# Patient Record
Sex: MALE | Race: WHITE | Employment: FULL TIME | URBAN - METROPOLITAN AREA
[De-identification: names, ages, dates, MRNs, and addresses within clinical notes are randomized per-mention and may not be internally consistent; named-entity substitution may affect disease eponyms.]

---

## 2017-08-18 ENCOUNTER — OFFICE VISIT (OUTPATIENT)
Dept: PEDIATRICS | Facility: CLINIC | Age: 27
End: 2017-08-18
Payer: COMMERCIAL

## 2017-08-18 VITALS — WEIGHT: 189 LBS | TEMPERATURE: 98.1 F | OXYGEN SATURATION: 99 % | HEART RATE: 85 BPM

## 2017-08-18 DIAGNOSIS — B35.3 ATHLETE'S FOOT ON RIGHT: Primary | ICD-10-CM

## 2017-08-18 PROCEDURE — 99202 OFFICE O/P NEW SF 15 MIN: CPT | Performed by: INTERNAL MEDICINE

## 2017-08-18 RX ORDER — CLOTRIMAZOLE AND BETAMETHASONE DIPROPIONATE 10; .64 MG/G; MG/G
CREAM TOPICAL 2 TIMES DAILY
Qty: 15 G | Refills: 1 | Status: SHIPPED | OUTPATIENT
Start: 2017-08-18 | End: 2019-08-16

## 2017-08-18 NOTE — PROGRESS NOTES
SUBJECTIVE:   Clayton Hatfield is a 27 year old male who presents to clinic today for the following health issues:      Rash      Duration: 1 months    Description  Location: right foor between toes  Itching: moderate    Intensity:  moderate    Accompanying signs and symptoms: redness and swelling    History (similar episodes/previous evaluation): None    Precipitating or alleviating factors:  New exposures:  None  Recent travel: no      Therapies tried and outcome: OTC Fungal creams no help      Had rash between the big toe and the second toe on both feet initially. Started using clotrimazole, with resolution of the rash in the left foot but not in the right foot. He switched to to  Terbinafine on the left foot, without further improvement. He tried to put gauze to separate the 2 toes to allow it to dry, but that caused more friction and irritation.    He is otherwise healthy no history of diabetes or other chronic illnesses. No recent antibiotic use.      No current outpatient prescriptions on file prior to visit.  No current facility-administered medications on file prior to visit.       Problem list, Medication list, Allergies, and Medical/Social/Surgical histories reviewed in [facility] and updated as appropriate.    OBJECTIVE:                                                    Pulse 85  Temp 98.1  F (36.7  C) (Temporal)  Wt 189 lb (85.7 kg)  SpO2 99%    GEN: healthy, alert and no distress  Right foot: Between the first and second toe there is a patch of erythema, some wetness, mildly scaly       Diagnostic test results:  No results found for this or any previous visit (from the past 24 hour(s)).       ASSESSMENT/PLAN:                                                      27 year old male with the following diagnoses and treatment plan:      ICD-10-CM    1. Athlete's foot on right B35.3 clotrimazole-betamethasone (LOTRISONE) cream     Might be secondary irritation type of inflammatory reaction in addition to  athlete's foot. I will like him to try a combination for clotrimazole and betamethasone. If there is no improvement in 1 week, we recommended he sees a dermatologist. I gave him the number for associated skin scare specialist if this does not improve with the above treatment.      Will call or return to clinic if worsening or symptoms not improving as discussed.  See Patient Instructions.        Juvencio Fuller MD-PhD  [facility]    (Note: Chart documentation was done in part with Dragon Voice Recognition software. Although reviewed after completion, some word and grammatical errors may remain.)

## 2017-08-18 NOTE — PATIENT INSTRUCTIONS
Make appointment(s) for:   -- dermatology if no improvement in one week.     [facility] [phone]         Medication(s) prescribed today:    Orders Placed This Encounter   Medications     clotrimazole-betamethasone (LOTRISONE) cream     Sig: Apply topically 2 times daily     Dispense:  15 g     Refill:  1

## 2017-08-18 NOTE — NURSING NOTE
Chief Complaint   Patient presents with     Derm Problem     Rash between toes on right foot, for 2 months, has tried 2 OTC's no help.        Initial Pulse 85  Temp 98.1  F (36.7  C) (Temporal)  Wt 189 lb (85.7 kg)  SpO2 99% There is no height or weight on file to calculate BMI.  Medication Reconciliation: complete

## 2017-08-18 NOTE — MR AVS SNAPSHOT
After Visit Summary   8/18/2017    Clayton Hatfield    MRN: [id number]           Patient Information     Date Of Birth          1990        Visit Information        Provider Department      8/18/2017 3:00 PM Juvencio Fuller MD [facility]        Today's Diagnoses     Athlete's foot on right    -  1      Care Instructions    Make appointment(s) for:   -- dermatology if no improvement in one week.     [facility] [phone]         Medication(s) prescribed today:    Orders Placed This Encounter   Medications     clotrimazole-betamethasone (LOTRISONE) cream     Sig: Apply topically 2 times daily     Dispense:  15 g     Refill:  1                   Follow-ups after your visit        Who to contact     If you have questions or need follow up information about today's clinic visit or your schedule please contact [facility] directly at [phone].  Normal or non-critical lab and imaging results will be communicated to you by [employer], letter or phone within 4 business days after the clinic has received the results. If you do not hear from us within 7 days, please contact the clinic through [employer] or phone. If you have a critical or abnormal lab result, we will notify you by phone as soon as possible.  Submit refill requests through [employer] or call your pharmacy and they will forward the refill request to us. Please allow 3 business days for your refill to be completed.          Additional Information About Your Visit        MyChart Information     [employer] gives you secure access to your electronic health record. If you see a primary care provider, you can also send messages to your care team and make appointments. If you have questions, please call your primary care clinic.  If you do not have a primary care provider, please call [phone] and they will assist you.      [employer] is an electronic gateway that provides easy, online access to your medical  records. With [employer], you can request a clinic appointment, read your test results, renew a prescription or communicate with your care team.     To access your existing account, please contact your [facility] or call [phone] for assistance.        Care EveryWhere ID     This is your Care EveryWhere ID. This could be used by other organizations to access your [facility] medical records  [id number]        Your Vitals Were     Pulse Temperature Pulse Oximetry             85 98.1  F (36.7  C) (Temporal) 99%          Blood Pressure from Last 3 Encounters:   No data found for BP    Weight from Last 3 Encounters:   08/18/17 189 lb (85.7 kg)              Today, you had the following     No orders found for display         Today's Medication Changes          These changes are accurate as of: 8/18/17  3:43 PM.  If you have any questions, ask your nurse or doctor.               Start taking these medicines.        Dose/Directions    clotrimazole-betamethasone cream   Commonly known as:  LOTRISONE   Used for:  Athlete's foot on right   Started by:  Juvencio Fuller MD        Apply topically 2 times daily   Quantity:  15 g   Refills:  1            Where to get your medicines      These medications were sent to [facility] - [place], [place] - [address], [address]  [address], [address], [facility] [address]     Phone:  [phone]     clotrimazole-betamethasone cream                Primary Care Provider    [facility]       No address on file        Equal Access to Services     BILL MONTALVO AH: Hadii aad ku hadasho Socharlesali, waaxda luqadaha, qaybta kaalmada adeegyada, shayla bridges. So [facility] [phone].    ATENCIÓN: Si habla español, tiene a anaya disposición servicios gratuitos de asistencia lingüística. Llame al [phone].    We comply with applicable federal civil rights laws and [place] laws. We do not  discriminate on the basis of race, color, national origin, age, disability sex, sexual orientation or gender identity.            Thank you!     Thank you for choosing [facility]  for your care. Our goal is always to provide you with excellent care. Hearing back from our patients is one way we can continue to improve our services. Please take a few minutes to complete the written survey that you may receive in the mail after your visit with us. Thank you!             Your Updated Medication List - Protect others around you: Learn how to safely use, store and throw away your medicines at www.disposemymeds.org.          This list is accurate as of: 8/18/17  3:43 PM.  Always use your most recent med list.                   Brand Name Dispense Instructions for use Diagnosis    clotrimazole-betamethasone cream    LOTRISONE    15 g    Apply topically 2 times daily    Athlete's foot on right

## 2019-08-16 ENCOUNTER — OFFICE VISIT (OUTPATIENT)
Dept: FAMILY MEDICINE | Facility: CLINIC | Age: 29
End: 2019-08-16
Payer: COMMERCIAL

## 2019-08-16 VITALS
OXYGEN SATURATION: 99 % | WEIGHT: 185 LBS | SYSTOLIC BLOOD PRESSURE: 118 MMHG | HEART RATE: 85 BPM | TEMPERATURE: 97.7 F | DIASTOLIC BLOOD PRESSURE: 70 MMHG | RESPIRATION RATE: 12 BRPM

## 2019-08-16 DIAGNOSIS — Z23 NEED FOR TDAP VACCINATION: ICD-10-CM

## 2019-08-16 DIAGNOSIS — Z71.84 ENCOUNTER FOR COUNSELING FOR TRAVEL: Primary | ICD-10-CM

## 2019-08-16 DIAGNOSIS — Z23 NEED FOR HEPATITIS A VACCINATION: ICD-10-CM

## 2019-08-16 DIAGNOSIS — Z23 NEED FOR IMMUNIZATION AGAINST TYPHOID: ICD-10-CM

## 2019-08-16 PROCEDURE — 90715 TDAP VACCINE 7 YRS/> IM: CPT | Performed by: PHYSICIAN ASSISTANT

## 2019-08-16 PROCEDURE — 90691 TYPHOID VACCINE IM: CPT | Performed by: PHYSICIAN ASSISTANT

## 2019-08-16 PROCEDURE — 90472 IMMUNIZATION ADMIN EACH ADD: CPT | Performed by: PHYSICIAN ASSISTANT

## 2019-08-16 PROCEDURE — 90471 IMMUNIZATION ADMIN: CPT | Performed by: PHYSICIAN ASSISTANT

## 2019-08-16 PROCEDURE — 99401 PREV MED CNSL INDIV APPRX 15: CPT | Mod: 25 | Performed by: PHYSICIAN ASSISTANT

## 2019-08-16 PROCEDURE — 90632 HEPA VACCINE ADULT IM: CPT | Performed by: PHYSICIAN ASSISTANT

## 2019-08-16 RX ORDER — ONDANSETRON 4 MG/1
4 TABLET, FILM COATED ORAL EVERY 8 HOURS PRN
Qty: 10 TABLET | Refills: 0 | Status: SHIPPED | OUTPATIENT
Start: 2019-08-16 | End: 2021-03-03

## 2019-08-16 RX ORDER — AZITHROMYCIN 500 MG/1
500 TABLET, FILM COATED ORAL DAILY
Qty: 3 TABLET | Refills: 0 | Status: SHIPPED | OUTPATIENT
Start: 2019-08-16 | End: 2019-08-19

## 2019-08-16 NOTE — PROGRESS NOTES
SUBJECTIVE: Clayton Hatfield , a 29 year old  male, presents for counseling and information regarding upcoming travel to [facility] and [facility]. Special medical concerns include: none. He anticipates the following unusual exposures: none.    Itinerary:  [facility] and [facility]    Departure Date: 8/26/19 Return date: 9/7/19    Reason for travel (i.e. Business, pleasure): pleasure    Visiting an urban or rural area?: urban    Accommodations (i.e. hotel, hostel, friends, family, etc): hotels    Women - First day of your last period: na    IMMUNIZATION HISTORY  Have you received any vaccinations in the past 4 weeks?  No  Have you ever fainted from having your blood drawn or from an injection?  No  Have you ever had a fever reaction to vaccination?  No  Have you ever had any bad reaction or side effect from any vaccination?  No  Have you ever had hepatitis A or B vaccine?  No  Do you live (or work closely) with anyone who has AIDS, an AIDS-like condition, any other immune disorder or who is on chemotherapy for cancer?  No  Have you received any injection of immune globulin or any blood products during the past 12 months?  No    GENERAL MEDICAL HISTORY  Do you have a medical condition that warrants maintenance medication or physician follow-up?  No  Do you have a medical condition that is stable now, but that may recur while traveling?  No  Has your spleen been removed?  No  Have you had an acute illness or a fever in the past 48 hours?  No  Are you pregnant, or might you become pregnant on this trip?  Any chance of pregnancy?  No  Are you breastfeeding?  No  Do you have HIV, AIDS, an AIDS-like condition, any other immune disorder, leukemia or cancer?  No  Do you have a severe combined immunodeficiency disease?  No  Have you had your thymus gland removed or history of problems with your thymus, such as myasthenia gravis, DiGeorge syndrome, or thymoma?  No    Do you have severe thrombocytopenia (low platelet count) or a  coagulation disorder?  No  Have you ever had a convulsion, seizure, epilepsy, neurologic condition or brain infection?  No  Do you have any stomach conditions?  No  Do you have a G6PD deficiency?  No  Do you have severe renal or kidney impairment?  No  Do you have a history of psychiatric problems?  No  Do you have a problem with strange dreams and/or nightmares?  No  Do you have insomnia?  No  Do you have problems with vaginitis?  No  Do you have psoriasis?  No  Are you prone to motion sickness?  Yes  Have you ever had headaches, nausea, vomiting, or breathing problems from altitude exposure?  No      No past medical history on file.   Immunization History   Administered Date(s) Administered     Influenza Vaccine IM 3yrs+ 4 Valent IIV4 10/02/2018       Current Outpatient Medications   Medication Sig Dispense Refill     clotrimazole-betamethasone (LOTRISONE) cream Apply topically 2 times daily 15 g 1     No Known Allergies     EXAM: deferred    Immunizations discussed include: Hepatitis A, Typhoid and Tetanus/Diphtheria  Malaraia prophylaxis recommended: not needed  Symptomatic treatment for traveler's diarrhea: bismuth subsalicylate, loperamide/diphenoxylate and azithromycin    ASSESSMENT/PLAN:    (Z71.89) Encounter for counseling for travel  (primary encounter diagnosis)    Comment: Hepatitis A, typhoid, and tdap vaccines today. Patient will return or follow-up with PCP in 6 months for Hep A booster. Prophylaxis given for Traveler's diarrhea and is not needed for Malaria. All questions were answered.     Plan: azithromycin (ZITHROMAX) 500 MG tablet,         ondansetron (ZOFRAN) 4 MG tablet            (Z23) Need for hepatitis A vaccination  Comment:   Plan: HEPA VACCINE ADULT IM            (Z23) Need for immunization against typhoid  Comment:   Plan: TYPHOID VACCINE, IM            (Z23) Need for Tdap vaccination  Comment:   Plan: TDAP, IM (10 - 64 YRS) - Adacel              I have reviewed general recommendations  for safe travel   including: food/water precautions, insect avoidance, safe sex   practices given high prevalence of HIV and other STDs,   roadway safety. Educational materials and links to the CDC   Traveler's health website have been provided.    Total time 15 minutes, greater than 50 percent in counseling   and coordination of care.

## 2019-08-16 NOTE — PATIENT INSTRUCTIONS
"See travel packet provided  Recommend ultrathon (mosquito repellant), pepto bismol and imodium  The food and drink choices you make while traveling can impact your likelihood of getting sick.   If you aren't sure if a food or drink is safe, the saying \" BOIL IT, COOK IT, PEEL IT, OR FORGET IT\" can help you decide whether it's okay to consume.   Also bring hand  and sun screen with you.  Safe Travels       Today August 16, 2019 you received the    Hepatitis A Vaccine - Please return on 2/16/20 or later for your 2nd and final dose.    Tetanus (Tdap) Vaccine    Typhoid - injectable. This vaccine is valid for two years.   .    These appointments can be made as a NURSE ONLY visit.    **It is very important for the vaccinations to be given on the scheduled day(s), this helps ensure you receive the full effectiveness of the vaccine.**    Please call [facility] with any questions [phone]    Thank you for visiting [facility]    "

## 2019-12-16 ENCOUNTER — OFFICE VISIT (OUTPATIENT)
Dept: FAMILY MEDICINE | Facility: CLINIC | Age: 29
End: 2019-12-16
Payer: COMMERCIAL

## 2019-12-16 VITALS
OXYGEN SATURATION: 98 % | RESPIRATION RATE: 18 BRPM | DIASTOLIC BLOOD PRESSURE: 87 MMHG | HEART RATE: 62 BPM | WEIGHT: 192.9 LBS | SYSTOLIC BLOOD PRESSURE: 131 MMHG | TEMPERATURE: 98 F

## 2019-12-16 DIAGNOSIS — Z71.84 ENCOUNTER FOR COUNSELING FOR TRAVEL: Primary | ICD-10-CM

## 2019-12-16 DIAGNOSIS — Z23 NEED FOR POLIO VACCINATION: ICD-10-CM

## 2019-12-16 PROCEDURE — 90471 IMMUNIZATION ADMIN: CPT | Performed by: PHYSICIAN ASSISTANT

## 2019-12-16 PROCEDURE — 90713 POLIOVIRUS IPV SC/IM: CPT | Performed by: PHYSICIAN ASSISTANT

## 2019-12-16 PROCEDURE — 99401 PREV MED CNSL INDIV APPRX 15: CPT | Mod: 25 | Performed by: PHYSICIAN ASSISTANT

## 2019-12-16 RX ORDER — ATOVAQUONE AND PROGUANIL HYDROCHLORIDE 250; 100 MG/1; MG/1
1 TABLET, FILM COATED ORAL DAILY
Qty: 16 TABLET | Refills: 0 | Status: SHIPPED | OUTPATIENT
Start: 2019-12-16 | End: 2021-03-03

## 2019-12-16 RX ORDER — AZITHROMYCIN 500 MG/1
TABLET, FILM COATED ORAL
Qty: 3 TABLET | Refills: 0 | Status: SHIPPED | OUTPATIENT
Start: 2019-12-16 | End: 2021-03-03

## 2019-12-16 NOTE — PROGRESS NOTES
Prior to immunization administration, verified patients identity using patient s name and date of birth. Please see Immunization Activity for additional information.     Screening Questionnaire for Adult Immunization    Are you sick today?   No   Do you have allergies to medications, food, a vaccine component or latex?   No   Have you ever had a serious reaction after receiving a vaccination?   No   Do you have a long-term health problem with heart, lung, kidney, or metabolic disease (e.g., diabetes), asthma, a blood disorder, no spleen, complement component deficiency, a cochlear implant, or a spinal fluid leak?  Are you on long-term aspirin therapy?   No   Do you have cancer, leukemia, HIV/AIDS, or any other immune system problem?   No   Do you have a parent, brother, or sister with an immune system problem?   No   In the past 3 months, have you taken medications that affect  your immune system, such as prednisone, other steroids, or anticancer drugs; drugs for the treatment of rheumatoid arthritis, Crohn s disease, or psoriasis; or have you had radiation treatments?   No   Have you had a seizure, or a brain or other nervous system problem?   No   During the past year, have you received a transfusion of blood or blood    products, or been given immune (gamma) globulin or antiviral drug?   No   For women: Are you pregnant or is there a chance you could become       pregnant during the next month?   No   Have you received any vaccinations in the past 4 weeks?   No     Immunization questionnaire answers were all negative.           Screening performed by Elizabeth Anton on 12/16/2019 at 5:42 PM.

## 2019-12-16 NOTE — PROGRESS NOTES
SUBJECTIVE: Clayton Hatfield , a 29 year old  male, presents for counseling and information regarding upcoming travel to [facility] and [facility] . Special medical concerns include: none. He anticipates the following unusual exposures: none.    Itinerary:  [facility] and [facility]    Departure Date: 01/29/2020 Return date: 02/08/2020    Reason for travel (i.e. Business, pleasure): pleasure    Visiting an urban or rural area?: urban    Accommodations (i.e. hotel, hostel, friends, family, etc): hotel    Women - First day of your last period: n/A    IMMUNIZATION HISTORY  Have you received any vaccinations in the past 4 weeks?  No  Have you ever fainted from having your blood drawn or from an injection?  No  Have you ever had a fever reaction to vaccination?  No  Have you ever had any bad reaction or side effect from any vaccination?  No  Have you ever had hepatitis A or B vaccine?  No  Do you live (or work closely) with anyone who has AIDS, an AIDS-like condition, any other immune disorder or who is on chemotherapy for cancer?  No  Have you received any injection of immune globulin or any blood products during the past 12 months?  No    GENERAL MEDICAL HISTORY  Do you have a medical condition that warrants maintenance medication or physician follow-up?  No  Do you have a medical condition that is stable now, but that may recur while traveling?  No  Has your spleen been removed?  No  Have you had an acute illness or a fever in the past 48 hours?  No  Are you pregnant, or might you become pregnant on this trip?  Any chance of pregnancy?  No  Are you breastfeeding?  No  Do you have HIV, AIDS, an AIDS-like condition, any other immune disorder, leukemia or cancer?  No  Do you have a severe combined immunodeficiency disease?  No  Have you had your thymus gland removed or history of problems with your thymus, such as myasthenia gravis, DiGeorge syndrome, or thymoma?  No    Do you have severe thrombocytopenia (low platelet count) or  a coagulation disorder?  No  Have you ever had a convulsion, seizure, epilepsy, neurologic condition or brain infection?  No  Do you have any stomach conditions?  No  Do you have a G6PD deficiency?  No  Do you have severe renal or kidney impairment?  No  Do you have a history of psychiatric problems?  No  Do you have a problem with strange dreams and/or nightmares?  No  Do you have insomnia?  No  Do you have problems with vaginitis?  No  Do you have psoriasis?  No  Are you prone to motion sickness?  No  Have you ever had headaches, nausea, vomiting, or breathing problems from altitude exposure?  No      No past medical history on file.   Immunization History   Administered Date(s) Administered     HepA-Adult 08/16/2019     Influenza Vaccine IM > 6 months Valent IIV4 10/02/2018, 10/12/2019     TDAP Vaccine (Adacel) 08/16/2019     Typhoid IM 08/16/2019       Current Outpatient Medications   Medication Sig Dispense Refill     ondansetron (ZOFRAN) 4 MG tablet Take 1 tablet (4 mg) by mouth every 8 hours as needed for nausea 10 tablet 0     No Known Allergies     EXAM: deferred    Immunizations discussed include: Polio  Malaria prophylaxis recommended: Malarone- [facility], [facility], [facility]  Symptomatic treatment for traveler's diarrhea: bismuth subsalicylate, loperamide/diphenoxylate and azithromycin    ASSESSMENT/PLAN:    (Z71.84) Encounter for counseling for travel  (primary encounter diagnosis)    Comment: Polio vaccines today. Patient will return or follow-up with PCP as needed. Prophylaxis given for Traveler's diarrhea and Malaria. All questions were answered.     Plan: atovaquone-proguanil (MALARONE) 250-100 MG         tablet, azithromycin (ZITHROMAX) 500 MG tablet            (Z23) Need for polio vaccination  Comment:   Plan: IPV      I have reviewed general recommendations for safe travel   including: food/water precautions, insect avoidance, safe sex   practices given high prevalence of HIV and other STDs,    roadway safety. Educational materials and links to the [facility]   Traveler's health website have been provided.    Total time 15 minutes, greater than 50 percent in counseling   and coordination of care.

## 2019-12-17 NOTE — PATIENT INSTRUCTIONS
"See travel packet provided  Recommend ultrathon (mosquito repellant), pepto bismol and imodium  The food and drink choices you make while traveling can impact your likelihood of getting sick.   If you aren't sure if a food or drink is safe, the saying \" BOIL IT, COOK IT, PEEL IT, OR FORGET IT\" can help you decide whether it's okay to consume.   Also bring hand  and sun screen with you.  Safe Travels       Today December 16, 2019 you received the    Polio (IPV) Vaccine  .    These appointments can be made as a NURSE ONLY visit.    **It is very important for the vaccinations to be given on the scheduled day(s), this helps ensure you receive the full effectiveness of the vaccine.**    Please call [facility] with any questions [phone]    Thank you for visiting [facility]    "

## 2020-03-11 ENCOUNTER — HEALTH MAINTENANCE LETTER (OUTPATIENT)
Age: 30
End: 2020-03-11

## 2020-12-27 ENCOUNTER — HEALTH MAINTENANCE LETTER (OUTPATIENT)
Age: 30
End: 2020-12-27

## 2021-01-28 ENCOUNTER — OFFICE VISIT (OUTPATIENT)
Dept: LAB | Facility: CLINIC | Age: 31
End: 2021-01-28
Attending: PHYSICIAN ASSISTANT
Payer: COMMERCIAL

## 2021-01-28 ENCOUNTER — E-VISIT (OUTPATIENT)
Dept: URGENT CARE | Facility: URGENT CARE | Age: 31
End: 2021-01-28
Payer: COMMERCIAL

## 2021-01-28 DIAGNOSIS — Z20.822 CLOSE EXPOSURE TO 2019 NOVEL CORONAVIRUS: Primary | ICD-10-CM

## 2021-01-28 DIAGNOSIS — Z20.822 CLOSE EXPOSURE TO 2019 NOVEL CORONAVIRUS: ICD-10-CM

## 2021-01-28 LAB
SARS-COV-2 RNA RESP QL NAA+PROBE: NORMAL
SPECIMEN SOURCE: NORMAL

## 2021-01-28 PROCEDURE — 99421 OL DIG E/M SVC 5-10 MIN: CPT | Performed by: PHYSICIAN ASSISTANT

## 2021-01-28 PROCEDURE — U0003 INFECTIOUS AGENT DETECTION BY NUCLEIC ACID (DNA OR RNA); SEVERE ACUTE RESPIRATORY SYNDROME CORONAVIRUS 2 (SARS-COV-2) (CORONAVIRUS DISEASE [COVID-19]), AMPLIFIED PROBE TECHNIQUE, MAKING USE OF HIGH THROUGHPUT TECHNOLOGIES AS DESCRIBED BY CMS-2020-01-R: HCPCS | Performed by: PHYSICIAN ASSISTANT

## 2021-01-28 PROCEDURE — 99207 PR NO CHARGE LOS: CPT

## 2021-01-28 PROCEDURE — U0005 INFEC AGEN DETEC AMPLI PROBE: HCPCS | Performed by: PHYSICIAN ASSISTANT

## 2021-01-28 NOTE — PATIENT INSTRUCTIONS
"  Dear Clayton Hatfield,    Based on your exposure to COVID-19 (coronavirus), we would like to test you for this virus. I have placed an order for this test. The best time for testing is 5-7 days after the exposure.    How to schedule:  For all employees or close contacts (except [employer] and [employer] - see below), go to your [employer] home page and scroll down to the section that says  You have an appointment that needs to be scheduled  and click the large green button that says  Schedule Now  and follow the steps to find the next available opening.     If you are unable to complete these steps or if you cannot find any available times, please call [phone] to schedule employee testing.     [facility] employees or close contacts, please call [phone].   [facility] [employer]) employees or close contacts call [phone].    Return to work/school/ guidance:   For people with high risk exposures outside the home    Please let your workplace manager and staffing office know when your quarantine ends.     We can not give you an exact date as it depends on the information below. You can calculate this on your own or work with your manager/staffing office to calculate this. (For example if you were exposed on 10/4, you would have to quarantine for 14 full days. That would be through 10/18. You could return on 10/19.)    Quarantine Guidelines:  Patients (\"contacts\") who have been in close prolonged contact of an infected person(s) (within six feet for at least 15 minutes within a 24 hour period), and remain asymptomatic should enter quarantine based on the following options:    14-day quarantine period (this remains the CDC recommendation for the greatest protection against spread of COVID-19) OR    Minimum 7-day quarantine with negative RT-PCR test collected on day 5 or later OR    10-day quarantine with no test  Quarantine Guideline exceptions are as follows:  ? People whose high-risk exposure was a household " member should always quarantine for 14 days from their last date of exposure  ? Residents of congregate care and congregate living settings should always quarantine for 14 days from their last date of exposure  ? Individuals who work in health care, congregate care, or congregate living should be off work for 14 days from their last date of exposure. Community activities for this group can be resumed based on options above.  ? For people with high risk exposure to an individual they live with it is still recommended to quarantine for 14 days the last date of exposure even if the covid test is negative.     Note: If you have ongoing exposure to the covid positive person, this quarantine period may be more than 14 days. (For example, if you are continued to be exposed to your child who tested positive and cannot isolate from them, then the quarantine of 7-14 days can't start until your child is no longer contagious. This is typically 10 days from onset of the child's symptoms. So the total duration may be 17-24 days in this case.)    You should continue symptom monitoring until day 14 post-exposure. If you develop signs or symptoms of COVID-19, isolate and get tested (even if you have been tested already).    How to quarantine:   Stay home and away from others. Don't go to school or anywhere else. Generally quarantine means staying home from work but there are some exceptions to this. Please contact your workplace.  No hugging, kissing or shaking hands.  Don't let anyone visit.  Cover your mouth and nose with a mask, tissue or washcloth to avoid spreading germs.  Wash your hands and face often. Use soap and water.    What are the symptoms of COVID-19?  The most common symptoms are cough, fever and trouble breathing. Less common symptoms include headache, body aches, fatigue (feeling very tired), chills, sore throat, stuffy or runny nose, diarrhea (loose poop), loss of taste or smell, belly pain, and nausea or vomiting  (feeling sick to your stomach or throwing up).  After 14 days, if you have still don't have symptoms, you likely don't have this virus.  If you develop symptoms, follow these guidelines.  If you're normally healthy: Please start another eVisit.  If you have a serious health problem (like cancer, heart failure, an organ transplant or kidney disease): Call your specialty clinic. Let them know that you might have COVID-19.    Where can I get more information?  [facility] - About COVID-19: [email or web address]  CDC - What to Do If You're Sick: www.cdc.gov/coronavirus/2019-ncov/about/steps-when-sick.html  CDC - Ending Home Isolation: www.cdc.gov/coronavirus/2019-ncov/hcp/disposition-in-home-patients.html  CDC - Caring for Someone: www.cdc.gov/coronavirus/2019-ncov/if-you-are-sick/care-for-someone.html  [facility] clinical trials (COVID-19 research studies): clinicalaffairs.[facility]-clinical-trials  Below are the COVID-19 hotlines at the [facility] of Health ([facility]). Interpreters are available.  For health questions: Call 594-899-6579 or 1-639.302.9204 (7 a.m. to 7 p.m.)  For questions about schools and childcare: Call 933-982-4768 or 1-388.366.6590 (7 a.m. to 7 p.m.)      January 28, 2021  RE:  Clayton Hatfield                                                                                                                   [address] [place] [address]      To whom it may concern:    I evaluated Clayton Hatfield on January 28, 2021. Clayton Hatfield should be excused from work/school.    They should let their workplace manager and staffing office know when their quarantine ends.    We can not give an exact date as it depends on the information below. They can calculate this on their own or work with their manager/staffing office to calculate this. (For example if they were exposed on 10/04, they would have to quarantine for 14 full days. That would be through 10/18. They could  "return on 10/19.)    Quarantine Guidelines:    Patients (\"contacts\") who have been in close prolonged contact of an infected person(s) (within six feet for at least 15 minutes within a 24 hour period) and remain asymptomatic should enter quarantine based on the following options:      14-day quarantine period (this remains the CDC recommendation for the greatest protection against spread of COVID-19) OR    Minimum 7-day quarantine with negative RT-PCR test collected on day 5 or later OR    10-day quarantine with no test   Quarantine Guideline exceptions are as follows:  ? People whose high-risk exposure was a household member should always quarantine for 14 days from their last date of exposure  ? Residents of congregate care and congregate living settings should always quarantine for 14 days from their last date of exposure  ? Individuals who work in health care, congregate care, or congregate living should be off work for 14 days from their last date of exposure. Community activities for this group can be resumed based on options above.    Note: If there is ongoing exposure to the covid positive person, this quarantine period may be longer than 14 days. (For example, if they are continually exposed to their child, who tested positive and cannot isolate from them, then the quarantine of 7-14 days can't start until their child is no longer contagious. This is typically 10 days from onset to the child's symptoms. So the total duration may be 17-24 days in this case.)    Clayton Hatfield should continue symptom monitoring until day 14 post-exposure. If they develop signs or symptoms of COVID-19, they should isolate and get tested (even if they have been tested already).    Sincerely,  Mathew Ascencio PA-C  "

## 2021-01-29 LAB
LABORATORY COMMENT REPORT: NORMAL
SARS-COV-2 RNA RESP QL NAA+PROBE: NEGATIVE
SPECIMEN SOURCE: NORMAL

## 2021-02-27 ASSESSMENT — ENCOUNTER SYMPTOMS
HEMATOCHEZIA: 0
JOINT SWELLING: 0
PARESTHESIAS: 0
HEARTBURN: 0
COUGH: 0
SHORTNESS OF BREATH: 0
SORE THROAT: 0
FEVER: 0
NAUSEA: 0
NERVOUS/ANXIOUS: 0
FREQUENCY: 0
ARTHRALGIAS: 0
CHILLS: 0
DIARRHEA: 0
PALPITATIONS: 0
EYE PAIN: 0
DYSURIA: 0
HEMATURIA: 0
CONSTIPATION: 0
MYALGIAS: 0
HEADACHES: 0
DIZZINESS: 0
WEAKNESS: 0
ABDOMINAL PAIN: 0

## 2021-03-03 ENCOUNTER — ANCILLARY PROCEDURE (OUTPATIENT)
Dept: GENERAL RADIOLOGY | Facility: CLINIC | Age: 31
End: 2021-03-03
Attending: STUDENT IN AN ORGANIZED HEALTH CARE EDUCATION/TRAINING PROGRAM
Payer: COMMERCIAL

## 2021-03-03 ENCOUNTER — OFFICE VISIT (OUTPATIENT)
Dept: FAMILY MEDICINE | Facility: CLINIC | Age: 31
End: 2021-03-03
Payer: COMMERCIAL

## 2021-03-03 VITALS
SYSTOLIC BLOOD PRESSURE: 127 MMHG | WEIGHT: 190 LBS | RESPIRATION RATE: 16 BRPM | OXYGEN SATURATION: 97 % | BODY MASS INDEX: 27.2 KG/M2 | DIASTOLIC BLOOD PRESSURE: 70 MMHG | TEMPERATURE: 98.1 F | HEART RATE: 67 BPM | HEIGHT: 70 IN

## 2021-03-03 DIAGNOSIS — Z00.00 ROUTINE GENERAL MEDICAL EXAMINATION AT A HEALTH CARE FACILITY: Primary | ICD-10-CM

## 2021-03-03 DIAGNOSIS — H02.402 PTOSIS OF LEFT EYELID: ICD-10-CM

## 2021-03-03 DIAGNOSIS — Z80.0 FAMILY HISTORY OF COLON CANCER: ICD-10-CM

## 2021-03-03 DIAGNOSIS — Z13.220 SCREENING FOR LIPID DISORDERS: ICD-10-CM

## 2021-03-03 LAB
BASOPHILS NFR BLD AUTO: 0.4 %
DIFFERENTIAL METHOD BLD: NORMAL
EOSINOPHIL NFR BLD AUTO: 1.2 %
ERYTHROCYTE [DISTWIDTH] IN BLOOD BY AUTOMATED COUNT: 11.7 % (ref 10–15)
ERYTHROCYTE [SEDIMENTATION RATE] IN BLOOD BY WESTERGREN METHOD: 4 MM/H (ref 0–15)
HBA1C MFR BLD: 4.9 % (ref 0–5.6)
HCT VFR BLD AUTO: 42.5 % (ref 40–53)
HGB BLD-MCNC: 15.5 G/DL (ref 13.3–17.7)
LYMPHOCYTES NFR BLD AUTO: 30.6 %
MCH RBC QN AUTO: 29.5 PG (ref 26.5–33)
MCHC RBC AUTO-ENTMCNC: 36.5 G/DL (ref 31.5–36.5)
MCV RBC AUTO: 81 FL (ref 78–100)
MONOCYTES NFR BLD AUTO: 6 %
NEUTROPHILS NFR BLD AUTO: 61.8 %
PLATELET # BLD AUTO: 293 10E9/L (ref 150–450)
RBC # BLD AUTO: 5.26 10E12/L (ref 4.4–5.9)
WBC # BLD AUTO: 6.8 10E9/L (ref 4–11)

## 2021-03-03 PROCEDURE — 71046 X-RAY EXAM CHEST 2 VIEWS: CPT | Performed by: RADIOLOGY

## 2021-03-03 PROCEDURE — 80061 LIPID PANEL: CPT | Performed by: STUDENT IN AN ORGANIZED HEALTH CARE EDUCATION/TRAINING PROGRAM

## 2021-03-03 PROCEDURE — 86140 C-REACTIVE PROTEIN: CPT | Performed by: STUDENT IN AN ORGANIZED HEALTH CARE EDUCATION/TRAINING PROGRAM

## 2021-03-03 PROCEDURE — 36415 COLL VENOUS BLD VENIPUNCTURE: CPT | Performed by: STUDENT IN AN ORGANIZED HEALTH CARE EDUCATION/TRAINING PROGRAM

## 2021-03-03 PROCEDURE — 83036 HEMOGLOBIN GLYCOSYLATED A1C: CPT | Performed by: STUDENT IN AN ORGANIZED HEALTH CARE EDUCATION/TRAINING PROGRAM

## 2021-03-03 PROCEDURE — 85652 RBC SED RATE AUTOMATED: CPT | Performed by: STUDENT IN AN ORGANIZED HEALTH CARE EDUCATION/TRAINING PROGRAM

## 2021-03-03 PROCEDURE — 99395 PREV VISIT EST AGE 18-39: CPT | Performed by: STUDENT IN AN ORGANIZED HEALTH CARE EDUCATION/TRAINING PROGRAM

## 2021-03-03 PROCEDURE — 84443 ASSAY THYROID STIM HORMONE: CPT | Performed by: STUDENT IN AN ORGANIZED HEALTH CARE EDUCATION/TRAINING PROGRAM

## 2021-03-03 PROCEDURE — 99214 OFFICE O/P EST MOD 30 MIN: CPT | Mod: 25 | Performed by: STUDENT IN AN ORGANIZED HEALTH CARE EDUCATION/TRAINING PROGRAM

## 2021-03-03 PROCEDURE — 85025 COMPLETE CBC W/AUTO DIFF WBC: CPT | Performed by: STUDENT IN AN ORGANIZED HEALTH CARE EDUCATION/TRAINING PROGRAM

## 2021-03-03 PROCEDURE — 80048 BASIC METABOLIC PNL TOTAL CA: CPT | Performed by: STUDENT IN AN ORGANIZED HEALTH CARE EDUCATION/TRAINING PROGRAM

## 2021-03-03 ASSESSMENT — MIFFLIN-ST. JEOR: SCORE: 1823.08

## 2021-03-03 ASSESSMENT — ENCOUNTER SYMPTOMS
PARESTHESIAS: 0
NERVOUS/ANXIOUS: 0
PALPITATIONS: 0
SORE THROAT: 0
SHORTNESS OF BREATH: 0
FREQUENCY: 0
HEARTBURN: 0
DYSURIA: 0
HEMATURIA: 0
HEADACHES: 0
CHILLS: 0
COUGH: 0
ABDOMINAL PAIN: 0
MYALGIAS: 0
FEVER: 0
EYE PAIN: 0
HEMATOCHEZIA: 0
DIARRHEA: 0
ARTHRALGIAS: 0
DIZZINESS: 0
NAUSEA: 0
JOINT SWELLING: 0
WEAKNESS: 0
CONSTIPATION: 0

## 2021-03-03 NOTE — PATIENT INSTRUCTIONS
You should receive calls to schedule your eye exam and neurology exams.     [facility] your [facility] test results.     Nice to meet you!    Preventive Health Recommendations  Male Ages 26 - 39    Yearly exam:             See your health care provider every year in order to  o   Review health changes.   o   Discuss preventive care.    o   Review your medicines if your doctor has prescribed any.    You should be tested each year for STDs (sexually transmitted diseases), if you re at risk.     After age 35, talk to your provider about cholesterol testing. If you are at risk for heart disease, have your cholesterol tested at least every 5 years.     If you are at risk for diabetes, you should have a diabetes test (fasting glucose).  Shots: Get a flu shot each year. Get a tetanus shot every 10 years.     Nutrition:    Eat at least 5 servings of fruits and vegetables daily.     Eat whole-grain bread, whole-wheat pasta and brown rice instead of white grains and rice.     Get adequate Calcium and Vitamin D.     Lifestyle    Exercise for at least 150 minutes a week (30 minutes a day, 5 days a week). This will help you control your weight and prevent disease.     Limit alcohol to one drink per day.     No smoking.     Wear sunscreen to prevent skin cancer.     See your dentist every six months for an exam and cleaning.

## 2021-03-03 NOTE — PROGRESS NOTES
bcSUBJECTIVE:   CC: Clayton Hatfield is an 30 year old male who presents for preventative health visit.       Patient has been advised of split billing requirements and indicates understanding: Yes  Healthy Habits:     Getting at least 3 servings of Calcium per day:  Yes    Bi-annual eye exam:  NO    Dental care twice a year:  Yes    Sleep apnea or symptoms of sleep apnea:  None    Diet:  Regular (no restrictions)    Frequency of exercise:  2-3 days/week    Duration of exercise:  15-30 minutes    Taking medications regularly:  Not Applicable    Medication side effects:  Not applicable    PHQ-2 Total Score: 0    Additional concerns today:  Yes    PROBLEMS TO ADD ON...    Left eyelid drooping going on for the past month.  Seems to be stable in severity since onset a month ago.  Drooping is isolated to the left upper eyelid.  Notes he will awaken the morning and his eyelid appears normal but then begins to droop over the course of the day.  Has not had blurred vision or eye pain nor double vision.  Does not wear contacts.  No history of trauma to the eye, head or neck.  No history of eye infection or ocular surgery.  Denies headaches, blurry vision, fatigue or malaise, night sweats, weight loss, fevers, difficulty swallowing, neck pain, muscle weakness, joint pain, weakness or numbness, gait difficulty, jaw pain, allergies, skin changes, chest pain, shortness of breath, cough.  Has not noted aggravating or alleviating factors.     Today's PHQ-2 Score:   PHQ-2 ( 1999 Pfizer) 2/27/2021   Q1: Little interest or pleasure in doing things 0   Q2: Feeling down, depressed or hopeless 0   PHQ-2 Score 0   Q1: Little interest or pleasure in doing things Not at all   Q2: Feeling down, depressed or hopeless Not at all   PHQ-2 Score 0     Abuse: Current or Past(Physical, Sexual or Emotional)- No  Do you feel safe in your environment? Yes    Have you ever done Advance Care Planning? (For example, a Health Directive, POLST, or a  "discussion with a medical provider or your loved ones about your wishes): No, advance care planning information given to patient to review.  Patient plans to discuss their wishes with loved ones or provider.      Social History     Tobacco Use     Smoking status: Never Smoker     Smokeless tobacco: Never Used   Substance Use Topics     Alcohol use: Yes     If you drink alcohol do you typically have >3 drinks per day or >7 drinks per week? No    Alcohol Use 3/3/2021   Prescreen: >3 drinks/day or >7 drinks/week? -   Prescreen: >3 drinks/day or >7 drinks/week? No     Last PSA: No results found for: PSA     Start colonoscopy screening at age 39. Mother tested for Baig syndrome and negative when tested.     Reviewed orders with patient. Reviewed health maintenance and updated orders accordingly - Yes  Lab work is in process    Reviewed and updated as needed this visit by clinical staff    Reviewed and updated as needed this visit by Provider      Review of Systems   Constitutional: Negative for chills and fever.   HENT: Negative for congestion, ear pain, hearing loss and sore throat.    Eyes: Negative for pain and visual disturbance.   Respiratory: Negative for cough and shortness of breath.    Cardiovascular: Negative for chest pain, palpitations and peripheral edema.   Gastrointestinal: Negative for abdominal pain, constipation, diarrhea, heartburn, hematochezia and nausea.   Genitourinary: Negative for discharge, dysuria, frequency, genital sores, hematuria, impotence and urgency.   Musculoskeletal: Negative for arthralgias, joint swelling and myalgias.   Skin: Negative for rash.   Neurological: Negative for dizziness, weakness, headaches and paresthesias.   Psychiatric/Behavioral: Negative for mood changes. The patient is not nervous/anxious.        OBJECTIVE:   /70   Pulse 67   Temp 98.1  F (36.7  C) (Oral)   Resp 16   Ht 1.778 m (5' 10\")   Wt 86.2 kg (190 lb)   SpO2 97%   BMI 27.26 kg/m      Physical " Exam  GENERAL: healthy, alert and no distress  EYES: Moderate ptosis left eyelid, pupils appear equal in size, they are reactive to light and accomodation, EOMI, no lid lesions  HENT: ear canals and TM's normal, nose and mouth without ulcers or lesions  NECK: no adenopathy, no asymmetry, masses, or scars and thyroid normal to palpation, no carotid bruit  RESP: lungs clear to auscultation - no rales, rhonchi or wheezes  CV: regular rate and rhythm, normal S1 S2, no S3 or S4, no murmur, click or rub, no peripheral edema and peripheral pulses strong  ABDOMEN: soft, nontender, no hepatosplenomegaly, no masses and bowel sounds normal  MS: no gross musculoskeletal defects noted, no edema  SKIN: no suspicious lesions or rashes  NEURO:   MENTAL STATUS: A&Ox3, memory intact, fund of knowledge appropriate  LANG/SPEECH:  fluent, follows 3-step commands  CRANIAL NERVES:    II: Pupils equal and reactive   III, IV, VI: EOM intact, no gaze preference or deviation, no nystagmus.    V: normal sensation in V1, V2, and V3 segments bilaterally    VII: no asymmetry, no nasolabial fold flattening    VIII: normal hearing to speech    IX, X: normal palatal elevation, no uvular deviation    XI: 5/5 head turn and 5/5 shoulder shrug bilaterally    XII: midline tongue protrusion  MOTOR:  5/5 muscle power in Rt upper and lower extremities  5/5 muscle power in Lt upper and lower extremities  REFLEXES: 2/4 throughout, bilateral flexor planter response  SENSORY:  Normal to touch all limbs  Romberg absent  COORD: Normal finger to nose and heel to shin, no tremor  GAIT: Normal  PSYCH: mentation appears normal, affect normal/bright    Diagnostic Test Results:  Labs pending.    ASSESSMENT/PLAN:   1. Routine general medical examination at a health care facility  Other topics addressed today include ptosis.     2. Screening for lipid disorders  Due for screen.   - Lipid panel reflex to direct LDL Non-fasting    3. Ptosis of left eyelid  Etiology unclear  "at this point. I have low concern for emergency causes at this point such as Carotid dissection, GCA, lung apex tumor, CNIII compression, [place] syndrome based on history of duration of 4 weeks and exam findings. Does not appear to be Tracy syndrome with symmetric pupillary size and dilation/restriction.  History is notable for fatigable ptosis. Will obtain baseline labs today and refer to Neurology for further evaluation in addition to ophthalmology. CXR done today and was unremarkable.   - CBC with platelets and differential  - Basic metabolic panel  (Ca, Cl, CO2, Creat, Gluc, K, Na, BUN)  - ESR: Erythrocyte sedimentation rate  - CRP, inflammation  - TSH with free T4 reflex  - EYE ADULT REFERRAL; Future  - XR Chest 2 Views  - NEUROLOGY ADULT REFERRAL  - Hemoglobin A1c    4. Family history of colon cancer  Patient qualifies for screening starting at age 40.       Patient has been advised of split billing requirements and indicates understanding: Yes  COUNSELING:   Reviewed preventive health counseling, as reflected in patient instructions    Estimated body mass index is 27.26 kg/m  as calculated from the following:    Height as of this encounter: 1.778 m (5' 10\").    Weight as of this encounter: 86.2 kg (190 lb).       He reports that he has never smoked. He has never used smokeless tobacco.      Counseling Resources:  ATP IV Guidelines  Pooled Cohorts Equation Calculator  FRAX Risk Assessment  ICSI Preventive Guidelines  Dietary Guidelines for Americans, 2010  USDA's MyPlate  ASA Prophylaxis  Lung CA Screening    Loulou Cantu MD  [facility]  "

## 2021-03-04 LAB
ANION GAP SERPL CALCULATED.3IONS-SCNC: 5 MMOL/L (ref 3–14)
BUN SERPL-MCNC: 19 MG/DL (ref 7–30)
CALCIUM SERPL-MCNC: 9.4 MG/DL (ref 8.5–10.1)
CHLORIDE SERPL-SCNC: 104 MMOL/L (ref 94–109)
CHOLEST SERPL-MCNC: 220 MG/DL
CO2 SERPL-SCNC: 26 MMOL/L (ref 20–32)
CREAT SERPL-MCNC: 0.85 MG/DL (ref 0.66–1.25)
CRP SERPL-MCNC: <2.9 MG/L (ref 0–8)
GFR SERPL CREATININE-BSD FRML MDRD: >90 ML/MIN/{1.73_M2}
GLUCOSE SERPL-MCNC: 113 MG/DL (ref 70–99)
HDLC SERPL-MCNC: 32 MG/DL
LDLC SERPL CALC-MCNC: 116 MG/DL
NONHDLC SERPL-MCNC: 188 MG/DL
POTASSIUM SERPL-SCNC: 3.8 MMOL/L (ref 3.4–5.3)
SODIUM SERPL-SCNC: 135 MMOL/L (ref 133–144)
TRIGL SERPL-MCNC: 358 MG/DL
TSH SERPL DL<=0.005 MIU/L-ACNC: 1.61 MU/L (ref 0.4–4)

## 2021-03-04 NOTE — RESULT ENCOUNTER NOTE
Johnson Davis,    Please note your lab results. Everything is normal (inflammatory markers, thyroid, kidney function, diabetes check) with exception of high cholesterol. Your triglycerides can be elevated due to not fasting, however LDL is not affected by this.   Your cholesterol level is above goal. High cholesterol--among other lifestyle factors--increases the risk of heart disease and stroke. You can help lower your cholesterol through lifestyle changes and specifically healthy nutrition and physical activity. A mediterranean style diet has been proven to lower cholesterol. This includes a focus on vegetables, fresh fruits, olive oil, legumes, low mercury fish, lean protein, low fat dairy and whole grains. See my specific dietary recommendations below. It is also recommended to get at least 150 minutes of moderate intensity exercise per week including strength training. I would recommend rechecking your cholesterol in 1 year. Please contact me if you have questions.    Foods to eat and what to avoid/minimize to lower cholesterol:      Foods to avoid:  -Processed foods (eg. frozen meals, pre packaged foods)  -Sucrose and fructose (check the ingredients)  -Refined carbohydrates (eg. white flour bread and crackers, pasta)  -Fast food  -Too much animal fat (red meat, excessive butter or dairy)  -Food or drink in plastic containers  -Soda, fruit juice, sweetened beverages  -Monserrate large meals  -High sugar fruits (eg. shon, pineapple, watermelon, grapes, cherries, bananas)     Foods to eat:  -Unlimited colorful vegetables  -Unlimited leafy greens  -Polyunsaturated and monounsaturated fats (see below)  -Olive oil  -Lean meat (turkey, chicken)  -Fish and seafood (eg. Sardines, salmon, mackerel)  -Whole grains (eg. Quinoa, oatmeal, buckwheat, brown rice, barley, rye, spelt)  -Avocados  -Unsalted nuts and seeds, raw when possible  -Eggs in moderation  -Berries, andrew, limes  -Cinnamon   -Drink water, herbal tea, green  tea    Healthiest sources of fats: olives, olive oil, avocado and avocado oil, canola oil, almonds, cashews, hazelnuts, macadamia nuts, peanuts, pecans, pistachios, flax seeds...    Tips: Cook with avocado oil. Add olive oil AFTER you cook foods to preserve the nutrients. When cooking a meal, make extra to eat the next day. Plan your meals ahead of time.       It was nice to meet you! Let us know if you have any trouble getting the neurology or optho consults set up.    Loulou Cantu MD

## 2021-08-30 NOTE — TELEPHONE ENCOUNTER
FUTURE VISIT INFORMATION      FUTURE VISIT INFORMATION:    Date: 8/31/21    Time: 12:15pm    [place] [facility] [place]:    Referring provider:  Loulou Cantu MD    Referring providers clinic:  [facility]    Reason for visit/diagnosis  Ptosis of left eyelid    RECORDS REQUESTED FROM:       Clinic name Comments Records Status Imaging Status   [facility] Ov/referral 3/3/21 epic

## 2021-08-31 ENCOUNTER — OFFICE VISIT (OUTPATIENT)
Dept: OPHTHALMOLOGY | Facility: CLINIC | Age: 31
End: 2021-08-31
Attending: STUDENT IN AN ORGANIZED HEALTH CARE EDUCATION/TRAINING PROGRAM
Payer: COMMERCIAL

## 2021-08-31 ENCOUNTER — PRE VISIT (OUTPATIENT)
Dept: OPHTHALMOLOGY | Facility: CLINIC | Age: 31
End: 2021-08-31

## 2021-08-31 ENCOUNTER — LAB (OUTPATIENT)
Dept: LAB | Facility: CLINIC | Age: 31
End: 2021-08-31
Payer: COMMERCIAL

## 2021-08-31 DIAGNOSIS — H49.12 PARALYTIC STRABISMUS ASSOCIATED WITH LEFT TROCHLEAR NERVE PALSY: ICD-10-CM

## 2021-08-31 DIAGNOSIS — H49.12 PARALYTIC STRABISMUS ASSOCIATED WITH LEFT TROCHLEAR NERVE PALSY: Primary | ICD-10-CM

## 2021-08-31 DIAGNOSIS — H53.2 DIPLOPIA: ICD-10-CM

## 2021-08-31 PROCEDURE — 83516 IMMUNOASSAY NONANTIBODY: CPT | Mod: 90 | Performed by: PATHOLOGY

## 2021-08-31 PROCEDURE — 36415 COLL VENOUS BLD VENIPUNCTURE: CPT | Performed by: PATHOLOGY

## 2021-08-31 PROCEDURE — 86255 FLUORESCENT ANTIBODY SCREEN: CPT | Mod: 90 | Performed by: PATHOLOGY

## 2021-08-31 PROCEDURE — 83519 RIA NONANTIBODY: CPT | Mod: 90 | Performed by: PATHOLOGY

## 2021-08-31 PROCEDURE — 99203 OFFICE O/P NEW LOW 30 MIN: CPT | Performed by: OPHTHALMOLOGY

## 2021-08-31 PROCEDURE — 92060 SENSORIMOTOR EXAMINATION: CPT | Performed by: OPHTHALMOLOGY

## 2021-08-31 ASSESSMENT — CONF VISUAL FIELD
METHOD: COUNTING FINGERS
OS_NORMAL: 1
OD_NORMAL: 1

## 2021-08-31 ASSESSMENT — TONOMETRY
IOP_METHOD: ICARE
OS_IOP_MMHG: 17
OD_IOP_MMHG: 15

## 2021-08-31 ASSESSMENT — CUP TO DISC RATIO
OS_RATIO: 0.3
OD_RATIO: 0.4

## 2021-08-31 ASSESSMENT — SLIT LAMP EXAM - LIDS
COMMENTS: NORMAL
COMMENTS: NORMAL

## 2021-08-31 ASSESSMENT — VISUAL ACUITY
OS_SC: 20/25
METHOD: SNELLEN - LINEAR
OD_SC: 20/20
OD_SC+: -1
OS_SC+: -1

## 2021-08-31 NOTE — PROGRESS NOTES
Chief Complaint(s) and History of Present Illness(es)     Droopy Eye Lid Evaluation     Laterality: left upper lid    Duration: 6    Course: gradually worsening    Associated signs and symptoms: double vision.  Negative for trauma and   eye pain    Pain scale: 0/10              Comments     Clayton Hatfield is being seen today at the request of Loulou Cantu for   left upper lid droop. Pt states 6 months ago, has random drooping of the   left eye that resolved itself. However for the last several weeks- has   noticed double vision. It is better if he closes an eye. No glasses. No   pain. Hx of lazy eye- left eye (wore corrective lenses then)     Francy Engle, COT COT 12:20 PM August 31, 2021       No pain.    No trouble speaking or swallowing.  No other areas of weakness.          Assessment & Plan     Clayton Hatfield is a 31 year old male with the following diagnoses:   Encounter Diagnoses   Name Primary?     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 A bit unusual in that his old pictures do not have any significant head tilt. I also am uncertain what caused his 1 week episode of ptosis. Will check myasthenia labs and refer to Dr. Amaya for further evaluation and treatment.   Patient disposition:   No follow-ups on file.        Attending Physician Attestation: Complete documentation of historical and exam elements from today's encounter can be found in the full encounter summary report (not reduplicated in this progress note). I personally obtained the chief complaint(s) and history of present illness. I confirmed and edited as necessary the review of systems, past medical/surgical history, family history, social history, and examination findings as documented by others; and I examined the patient myself. I personally reviewed the relevant tests, images, and reports as documented above.  I formulated and edited as necessary the assessment and plan and discussed the findings and management plan with the patient.  -Bre Oviedo MD

## 2021-08-31 NOTE — NURSING NOTE
Chief Complaints and History of Present Illnesses   Patient presents with     Droopy Eye Lid Evaluation     Chief Complaint(s) and History of Present Illness(es)     Droopy Eye Lid Evaluation     Laterality: left upper lid    Duration: 6    Course: gradually worsening    Associated signs and symptoms: double vision.  Negative for trauma and eye pain    Pain scale: 0/10              Comments     Clayton Hatfield is being seen today at the request of Loulou Cantu for left upper lid droop. Pt states 6 months ago, has random drooping of the left eye that resolved itself. However for the last several weeks- has noticed double vision. It is better if he closes an eye. No glasses. No pain. Hx of lazy eye- left eye (wore corrective lenses then)     Francy Engle, SAMANTHA COT 12:20 PM August 31, 2021

## 2021-09-04 LAB — STRIA MUS IGG SER QL IF: NORMAL

## 2021-09-05 LAB — ACHR BLOCK AB/ACHR TOTAL SFR SER: 16 %

## 2021-09-06 LAB — ACHR MOD AB/ACHR TOTAL SFR SER: 18 %

## 2021-09-08 DIAGNOSIS — H02.409 PTOSIS: Primary | ICD-10-CM

## 2021-09-08 LAB — ACHR BIND AB SER-SCNC: 1.8 NMOL/L

## 2021-09-27 ENCOUNTER — OFFICE VISIT (OUTPATIENT)
Dept: NEUROLOGY | Facility: CLINIC | Age: 31
End: 2021-09-27
Payer: COMMERCIAL

## 2021-09-27 ENCOUNTER — LAB (OUTPATIENT)
Dept: LAB | Facility: CLINIC | Age: 31
End: 2021-09-27
Payer: COMMERCIAL

## 2021-09-27 VITALS
DIASTOLIC BLOOD PRESSURE: 64 MMHG | SYSTOLIC BLOOD PRESSURE: 143 MMHG | RESPIRATION RATE: 16 BRPM | OXYGEN SATURATION: 98 % | HEART RATE: 75 BPM

## 2021-09-27 DIAGNOSIS — H02.402 PTOSIS OF LEFT EYELID: Primary | ICD-10-CM

## 2021-09-27 DIAGNOSIS — R29.818 UPWARD GAZE DEVIATION: ICD-10-CM

## 2021-09-27 DIAGNOSIS — H02.402 PTOSIS OF LEFT EYELID: ICD-10-CM

## 2021-09-27 LAB — CK SERPL-CCNC: 170 U/L (ref 30–190)

## 2021-09-27 PROCEDURE — 99205 OFFICE O/P NEW HI 60 MIN: CPT | Performed by: PSYCHIATRY & NEUROLOGY

## 2021-09-27 PROCEDURE — 36415 COLL VENOUS BLD VENIPUNCTURE: CPT

## 2021-09-27 PROCEDURE — 83519 RIA NONANTIBODY: CPT | Mod: 59

## 2021-09-27 PROCEDURE — 82550 ASSAY OF CK (CPK): CPT

## 2021-09-27 ASSESSMENT — PAIN SCALES - GENERAL: PAINLEVEL: NO PAIN (0)

## 2021-09-27 NOTE — PATIENT INSTRUCTIONS
Your exam today is normal.  Given your clinical history, it is possible you have occular myasthenia versus other nerve compressing lesions.  Further investigation with imaging of the brain is needed to rule out a mass or compressive lesion.  Other imaging of the chest is also needed to look for a thymoma as well as compressive lesion to the vagus sympathetic chain ganglion which could cause a lateralized ptosis.  You will also need a test to look for other neoplastic causes of ptosis (paraneoplastic panel looking for a condition of the voltage gated calcium channels).  You will also need a test to look for myasthenia gravis (single fiber EMG).    Overall, pending these test results, we may discuss treatment and monitoring for possible autoimmune neuromuscular conditions versus compressive nerve lesion versus just monitoring in and of itself.    Tests to be done:  MRI brain w/wout contrast (cranial nerve protocol, specifically for trochlear and oculomotor).  CT chest (investigate thymoma)  Paraneoplastic panel (VGCC p/q primarily)  Single fiber EMG

## 2021-09-27 NOTE — PROGRESS NOTES
[facility] Neurology Consultation    Clayton Hatfield MRN# [id number]   Age: 31 year old YOB: 1990     Requesting physician: Loulou Engel, [facility]     Reason for Consultation: ptosis      History of Presenting Symptoms:   Clayton Hatfield is a 31 year old male who presents today for evaluation of ptosis.  The patient developed acute onset of left eyelid droop some 6-7 months ago which has not progressed, but has led to diplopia.  He was seen with an ophthalmologist, Dr. Oviedo, on 8/31/2021 and reported this symptom, along with a history of left lazy eye (previously wore corrective lenses).  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  Upon further chart review, the patient did report his ptosis would worsen through the day.  No pupillary deficit was reported.  There was no vascular imaging, or brain imaging done that I can review prior to 9/27/2021 visit, but it does appear that the patient had CBC, BMP, ESR, CRP, TSH, A1c done on 3/3/2021 which was normal except for elevated LDL, TG, Cholesterol.      Today, the patient indicates having eyelid drooping on the left side for 2-3 weeks in early 03/2021.  His symptoms improved up unto a month ago, late August, he started developing double vision.  His double vision would start mid-late AM and worsen though the day.  At night, he would note his left eye would tilt up-norman without eyelid changes.  These symptoms lasted until 9/17.  He does recall that he had a respiratory illness or flu-like illness some weeks after his 08/2021 neurological symptoms occurred.  He describes his diplopia as horizontal, and without light sensitivity.      He has no ongoing symptoms.  He doesn't have issues of temperature regulation, gynecomastia, dysphagia or hoarseness, cough, vertigo or dizziness, or ataxia/atypical  movements.        Past Medical History:   No major medical history     Social History:   No major drinking or smoking history     Medications:   No ongoing use of medications     Physical Exam:   Vitals: BP (!) 143/64   Pulse 75   Resp 16   SpO2 98%    General: Seated comfortably in no acute distress.  HEENT: Neck supple with normal range of motion. No paracervical muscle tenderness or tightness.  Optic discs sharp and vasculature normal on funduscopic exam.  No positive for 2 minute upgaze. No near light dissociation. No convergence retraction nystagmus.   Neurologic:     Mental Status: Fully alert, attentive and oriented. Speech clear and fluent, no paraphasic errors.      Cranial Nerves: Visual fields intact. PERRL. EOMI with normal smooth pursuit. Facial sensation intact/symmetric. Facial movements symmetric. Hearing not formally tested but intact to conversation. Palate elevation symmetric, uvula midline. No dysarthria. Shoulder shrug strong bilaterally. Tongue protrusion midline.     Motor: No tremors or other abnormal movements observed. Muscle tone normal throughout. No pronator drift. Normal/symmetric rapid finger tapping. Strength 5/5 throughout upper and lower extremities.     Deep Tendon Reflexes: 2+/symmetric throughout upper and lower extremities. No clonus. Toes downgoing bilaterally.     Sensory: Intact/symmetric to light touch, pinprick throughout upper and lower extremities.      Coordination: Finger-nose-finger without dysmetria. Rapid alternating movements intact/symmetric with normal speed and rhythm.     Gait: Normal, steady casual gait.        Data: Pertinent prior to visit   Imaging:  None         Assessment and Plan:   Assessment:  Transient diplopia, and left eye ptosis with resting upward (unilateral gaze)    The patient describes symptoms that are transient in nature and of a unilateral eye motor and sympathetic deficit.  Generally, oculomotor myasthenia or myasthenia in general would  be expected to give a b/l presentation, but it can have unilateral onset less often.  There is no pupillary deficit to report, but no exam was done during his palsy so this is difficult to assume normal or abnormal.  While CNIV can lead to a torsion deficit as well as superior palsy, I wouldn't expect ptosis to accompany it, and further imaging of the brain to look for mass or lesion in the brainstem (midbrain) as well as cavernous sinus and superior orbital fissure would be helpful.    The patient did have a positive osman marker for AchR binding osman, and given his ptosis with eye deviation, I do think further investigation with single fiber EMG, paraneoplastic panel (looking primarily for VGCC P/Q), and a chest CT to look for thymoma is reasonable.       Plan:  MRI brain w/wout contrast (cranial nerve protocol, specifically for trochlear and oculomotor).  CT chest (investigate thymoma)  Paraneoplastic panel (VGCC p/q primarily)  Single fiber EMG    I did ask the patient to obtain video of his eye movements should they return, and then send them to me through [employer] if possible.    Follow up in Neurology clinic in 2 months or should new concerns arise.    HODA Fox D.O.   of Neurology    Total time  today (60 min) in this patient encounter was spent on pre-charting, counseling and/or coordination of care. We reviewed diagnostic results, impressions, and discussed other possible tests if symptoms do not improve. We discussed the implications of the diagnosis, as well as risks and benefits of management options. We reviewed treatment instructions and our scheduled follow-up as specified in the discharge plan. We also discussed the importance of compliance with the chosen course of treatment. The patient is in agreement with this plan and has no further questions.

## 2021-09-27 NOTE — LETTER
9/27/2021         RE: Clayton Hatfield  [address] [place] [address]        Dear Colleague,    Thank you for referring your patient, Clayton Hatfield, to the [facility]. Please see a copy of my visit note below.    [facility] Neurology Consultation    Clayton Hatfield MRN# [id number]   Age: 31 year old YOB: 1990     Requesting physician: Loulou Cantu  [facility], [facility]     Reason for Consultation: ptosis      History of Presenting Symptoms:   Clayton Hatfield is a 31 year old male who presents today for evaluation of ptosis.  The patient developed acute onset of left eyelid droop some 6-7 months ago which has not progressed, but has led to diplopia.  He was seen with an ophthalmologist, Dr. Oviedo, on 8/31/2021 and reported this symptom, along with a history of left lazy eye (previously wore corrective lenses).  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  Upon further chart review, the patient did report his ptosis would worsen through the day.  No pupillary deficit was reported.  There was no vascular imaging, or brain imaging done that I can review prior to 9/27/2021 visit, but it does appear that the patient had CBC, BMP, ESR, CRP, TSH, A1c done on 3/3/2021 which was normal except for elevated LDL, TG, Cholesterol.      Today, the patient indicates having eyelid drooping on the left side for 2-3 weeks in early 03/2021.  His symptoms improved up unto a month ago, late August, he started developing double vision.  His double vision would start mid-late AM and worsen though the day.  At night, he would note his left eye would tilt up-norman without eyelid changes.  These symptoms lasted until 9/17.  He does recall that he had a respiratory illness or flu-like illness some weeks after his 08/2021 neurological  symptoms occurred.  He describes his diplopia as horizontal, and without light sensitivity.      He has no ongoing symptoms.  He doesn't have issues of temperature regulation, gynecomastia, dysphagia or hoarseness, cough, vertigo or dizziness, or ataxia/atypical movements.        Past Medical History:   No major medical history     Social History:   No major drinking or smoking history     Medications:   No ongoing use of medications     Physical Exam:   Vitals: BP (!) 143/64   Pulse 75   Resp 16   SpO2 98%    General: Seated comfortably in no acute distress.  HEENT: Neck supple with normal range of motion. No paracervical muscle tenderness or tightness.  Optic discs sharp and vasculature normal on funduscopic exam.  No positive for 2 minute upgaze. No near light dissociation. No convergence retraction nystagmus.   Neurologic:     Mental Status: Fully alert, attentive and oriented. Speech clear and fluent, no paraphasic errors.      Cranial Nerves: Visual fields intact. PERRL. EOMI with normal smooth pursuit. Facial sensation intact/symmetric. Facial movements symmetric. Hearing not formally tested but intact to conversation. Palate elevation symmetric, uvula midline. No dysarthria. Shoulder shrug strong bilaterally. Tongue protrusion midline.     Motor: No tremors or other abnormal movements observed. Muscle tone normal throughout. No pronator drift. Normal/symmetric rapid finger tapping. Strength 5/5 throughout upper and lower extremities.     Deep Tendon Reflexes: 2+/symmetric throughout upper and lower extremities. No clonus. Toes downgoing bilaterally.     Sensory: Intact/symmetric to light touch, pinprick throughout upper and lower extremities.      Coordination: Finger-nose-finger without dysmetria. Rapid alternating movements intact/symmetric with normal speed and rhythm.     Gait: Normal, steady casual gait.        Data: Pertinent prior to visit   Imaging:  None         Assessment and Plan:    Assessment:  Transient diplopia, and left eye ptosis with resting upward (unilateral gaze)    The patient describes symptoms that are transient in nature and of a unilateral eye motor and sympathetic deficit.  Generally, oculomotor myasthenia or myasthenia in general would be expected to give a b/l presentation, but it can have unilateral onset less often.  There is no pupillary deficit to report, but no exam was done during his palsy so this is difficult to assume normal or abnormal.  While CNIV can lead to a torsion deficit as well as superior palsy, I wouldn't expect ptosis to accompany it, and further imaging of the brain to look for mass or lesion in the brainstem (midbrain) as well as cavernous sinus and superior orbital fissure would be helpful.    The patient did have a positive osman marker for AchR binding osman, and given his ptosis with eye deviation, I do think further investigation with single fiber EMG, paraneoplastic panel (looking primarily for VGCC P/Q), and a chest CT to look for thymoma is reasonable.       Plan:  MRI brain w/wout contrast (cranial nerve protocol, specifically for trochlear and oculomotor).  CT chest (investigate thymoma)  Paraneoplastic panel (VGCC p/q primarily)  Single fiber EMG    I did ask the patient to obtain video of his eye movements should they return, and then send them to me through [employer] if possible.    Follow up in Neurology clinic in 2 months or should new concerns arise.    HODA Fox D.O.   of Neurology    Total time  today (60 min) in this patient encounter was spent on pre-charting, counseling and/or coordination of care. We reviewed diagnostic results, impressions, and discussed other possible tests if symptoms do not improve. We discussed the implications of the diagnosis, as well as risks and benefits of management options. We reviewed treatment instructions and our scheduled follow-up as specified in the discharge plan. We also  discussed the importance of compliance with the chosen course of treatment. The patient is in agreement with this plan and has no further questions.        Again, thank you for allowing me to participate in the care of your patient.        Sincerely,        Virgil Fox, DO

## 2021-10-04 LAB
AMPHIPHYSIN AB TITR SER: NEGATIVE TITER
CV2 IGG TITR SER: NEGATIVE TITER
GLIAL NUC TYPE 1 AB TITR SER: NEGATIVE TITER
HU1 AB TITR SER: NEGATIVE TITER
HU2 AB TITR SER IF: NEGATIVE TITER
HU3 AB TITR SER: NEGATIVE TITER
IMMUNOLOGIST REVIEW: NORMAL
LABORATORY COMMENT REPORT: NORMAL
NACHR AB SER-SCNC: 0 NMOL/L
PCA-1 AB TITR SER: NEGATIVE TITER
PCA-2 AB TITR SER: NEGATIVE TITER
PCA-TR AB TITR SER IF: NEGATIVE TITER
VGCC-P/Q BIND AB SER-SCNC: 0 NMOL/L
VGKC AB SER-SCNC: 0 NMOL/L

## 2021-10-23 ENCOUNTER — ANCILLARY PROCEDURE (OUTPATIENT)
Dept: CT IMAGING | Facility: CLINIC | Age: 31
End: 2021-10-23
Attending: PSYCHIATRY & NEUROLOGY
Payer: COMMERCIAL

## 2021-10-23 ENCOUNTER — ANCILLARY PROCEDURE (OUTPATIENT)
Dept: MRI IMAGING | Facility: CLINIC | Age: 31
End: 2021-10-23
Attending: PSYCHIATRY & NEUROLOGY
Payer: COMMERCIAL

## 2021-10-23 DIAGNOSIS — H02.402 PTOSIS OF LEFT EYELID: ICD-10-CM

## 2021-10-23 DIAGNOSIS — R29.818 UPWARD GAZE DEVIATION: ICD-10-CM

## 2021-10-23 PROCEDURE — 71260 CT THORAX DX C+: CPT | Performed by: RADIOLOGY

## 2021-10-23 PROCEDURE — 70553 MRI BRAIN STEM W/O & W/DYE: CPT | Mod: GC | Performed by: RADIOLOGY

## 2021-10-23 PROCEDURE — A9585 GADOBUTROL INJECTION: HCPCS | Performed by: RADIOLOGY

## 2021-10-23 RX ORDER — IOPAMIDOL 755 MG/ML
93 INJECTION, SOLUTION INTRAVASCULAR ONCE
Status: COMPLETED | OUTPATIENT
Start: 2021-10-23 | End: 2021-10-23

## 2021-10-23 RX ORDER — GADOBUTROL 604.72 MG/ML
10 INJECTION INTRAVENOUS ONCE
Status: COMPLETED | OUTPATIENT
Start: 2021-10-23 | End: 2021-10-23

## 2021-10-23 RX ADMIN — GADOBUTROL 8 ML: 604.72 INJECTION INTRAVENOUS at 10:58

## 2021-10-23 RX ADMIN — IOPAMIDOL 93 ML: 755 INJECTION, SOLUTION INTRAVASCULAR at 10:33

## 2021-10-23 NOTE — DISCHARGE INSTRUCTIONS
MRI Contrast Discharge Instructions    The IV contrast you received today will pass out of your body in your  urine. This will happen in the next 24 hours. You will not feel this process.  Your urine will not change color.    Drink at least 4 extra glasses of water or juice today (unless your doctor  has restricted your fluids). This reduces the stress on your kidneys.  You may take your regular medicines.    If you are on dialysis: It is best to have dialysis today.    If you have a reaction: Most reactions happen right away. If you have  any new symptoms after leaving the hospital (such as hives or swelling),  call your hospital at the correct number below. Or call your family doctor.  If you have breathing distress or wheezing, call 911.    Special instructions: ***    I have read and understand the above information.    Signature:______________________________________ Date:___________    Staff:__________________________________________ Date:___________     Time:__________    [place] [facility] Departments:    ___[place]: [phone]  ___[facility]: [phone]  ___[place]: [phone] ___[facility]: [phone]  ___[facility]: [phone]  ___[facility]: [phone]  ___[phone]  ___[facility]: [phone]  ___[phone]

## 2021-12-13 ENCOUNTER — VIRTUAL VISIT (OUTPATIENT)
Dept: NEUROLOGY | Facility: CLINIC | Age: 31
End: 2021-12-13
Payer: COMMERCIAL

## 2021-12-13 DIAGNOSIS — H02.402 PTOSIS OF LEFT EYELID: Primary | ICD-10-CM

## 2021-12-13 PROCEDURE — 99214 OFFICE O/P EST MOD 30 MIN: CPT | Mod: 95 | Performed by: PSYCHIATRY & NEUROLOGY

## 2021-12-13 NOTE — LETTER
12/13/2021         RE: Clayton Hatfield  [address] [place] [address]        Dear Colleague,    Thank you for referring your patient, Clayton Hatfield, to the [facility]. Please see a copy of my visit note below.    [facility] Neurology Follow Up Visit    Clayton Hatfield MRN# [id number]   Age: 31 year old YOB: 1990     Brief history of symptoms: The patient was initially seen in neurologic consultation on 9/27/2021 for evaluation of ptosis. Please see the comprehensive neurologic consultation notes from those dates in the Epic records for details.     Overall impression was for transient diplopia, left eye ptosis with resting upward unilateral gaze with a positive AChR binding osman.  He was to have MRI brain with CN protocol, CT chest, paraneoplastic panel, and a single fiber EMG.    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  - CT chest 10/23/2021 showed no atypical thymus tissue for age.  Follow up imaging to exclude thymic neoplasm recommended.  - Paraneoplastic panel was normal  - CK normal    Today, the patient hasn't had any return of his symptoms since a few weeks prior to our last visit.  Prior symptoms were that of diplopia, and left ptosis. He has no ongoing weakness, swallowing deficits, or new symptoms to report.         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  We discussed today that should his symptoms return, he may need repeat imaging, especially if new neurological deficits are present  as well.      Plan:  Neuromuscular neurologist, review case and decide if single fiber EMG is needed    Follow up in Neurology clinic in one year, or earlier as needed should new concerns arise.    HODA Fox D.O.   of Neurology    Total time today (21 min) in this patient encounter was spent on pre-charting, counseling and/or coordination of care.         Again, thank you for allowing me to participate in the care of your patient.        Sincerely,        Virgil Fox, DO

## 2021-12-13 NOTE — PROGRESS NOTES
[facility] Neurology Follow Up Visit    Clayton Hatfield MRN# [id number]   Age: 31 year old YOB: 1990     Brief history of symptoms: The patient was initially seen in neurologic consultation on 9/27/2021 for evaluation of ptosis. Please see the comprehensive neurologic consultation notes from those dates in the Epic records for details.     Overall impression was for transient diplopia, left eye ptosis with resting upward unilateral gaze with a positive AChR binding osman.  He was to have MRI brain with CN protocol, CT chest, paraneoplastic panel, and a single fiber EMG.    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  - CT chest 10/23/2021 showed no atypical thymus tissue for age.  Follow up imaging to exclude thymic neoplasm recommended.  - Paraneoplastic panel was normal  - CK normal    Today, the patient hasn't had any return of his symptoms since a few weeks prior to our last visit.  Prior symptoms were that of diplopia, and left ptosis. He has no ongoing weakness, swallowing deficits, or new symptoms to report.         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  We discussed today that should his symptoms return, he may need repeat imaging, especially if new neurological deficits are present as well.      Plan:  Neuromuscular neurologist, review case and decide if single fiber EMG is needed    Follow up in Neurology clinic in one year, or earlier as needed should new concerns arise.    HODA Fox D.O.   of  Neurology    Total time today (21 min) in this patient encounter was spent on pre-charting, counseling and/or coordination of care.

## 2021-12-13 NOTE — PROCEDURES
Ryan Hatfield is a 31 year old who is being evaluated via a billable video visit.    How would you like to obtain your AVS? video  If the video is dropped, the invitation should be resent by: video    Video Start Time: 430    Video-Visit Details    Type of service:  video    Video End Time: 454    Originating Location (pt. Location): home    Distant Location (provider location):  [facility]       Platform used for Video Visit: [employer]

## 2022-05-21 ENCOUNTER — HEALTH MAINTENANCE LETTER (OUTPATIENT)
Age: 32
End: 2022-05-21

## 2022-09-17 ENCOUNTER — HEALTH MAINTENANCE LETTER (OUTPATIENT)
Age: 32
End: 2022-09-17

## 2022-10-01 ENCOUNTER — OFFICE VISIT (OUTPATIENT)
Dept: URGENT CARE | Facility: URGENT CARE | Age: 32
End: 2022-10-01
Payer: COMMERCIAL

## 2022-10-01 VITALS
TEMPERATURE: 97.2 F | DIASTOLIC BLOOD PRESSURE: 88 MMHG | HEART RATE: 70 BPM | SYSTOLIC BLOOD PRESSURE: 138 MMHG | OXYGEN SATURATION: 98 %

## 2022-10-01 DIAGNOSIS — J01.90 ACUTE SINUSITIS WITH SYMPTOMS > 10 DAYS: Primary | ICD-10-CM

## 2022-10-01 PROCEDURE — 99213 OFFICE O/P EST LOW 20 MIN: CPT | Performed by: PHYSICIAN ASSISTANT

## 2022-10-01 ASSESSMENT — ENCOUNTER SYMPTOMS
MYALGIAS: 0
COUGH: 0
SORE THROAT: 0
NEUROLOGICAL NEGATIVE: 1
CHEST TIGHTNESS: 0
PALPITATIONS: 0
CARDIOVASCULAR NEGATIVE: 1
DYSURIA: 0
WHEEZING: 0
CONSTITUTIONAL NEGATIVE: 1
SINUS PRESSURE: 1
RHINORRHEA: 0
RESPIRATORY NEGATIVE: 1
ABDOMINAL PAIN: 0
SINUS PAIN: 1
CHILLS: 0
VOMITING: 0
GASTROINTESTINAL NEGATIVE: 1
HEMATURIA: 0
MUSCULOSKELETAL NEGATIVE: 1
DIARRHEA: 0
SHORTNESS OF BREATH: 0
HEADACHES: 0
ALLERGIC/IMMUNOLOGIC NEGATIVE: 1
NAUSEA: 0
FREQUENCY: 0
FEVER: 0

## 2022-10-01 NOTE — PROGRESS NOTES
Chief Complaint:     Chief Complaint   Patient presents with     Urgent Care     Sinus Problem     X's 10 days      Jaw Pain     Nasal Congestion       No results found for any visits on 10/01/22.    Medical Decision Making:    Vital signs reviewed by Ventura Byrne PA-C  /88   Pulse 70   Temp 97.2  F (36.2  C) (Temporal)   SpO2 98%     Differential Diagnosis:  URI Adult/Peds:  Sinusitis, Viral syndrome and Viral upper respiratory illness        ASSESSMENT    1. Acute sinusitis with symptoms > 10 days        PLAN    Patient is in no acute distress.    Temp is 97.2 in clinic today, lung sounds were clear, and O2 sats at 98% on RA.    With length of symptoms, Rx for Augmentin sent in.  Rest, Push fluids, vaporizer, elevation of head of bed.  Ibuprofen and or Tylenol for any fever or body aches.  If symptoms worsen, recheck immediately otherwise follow up with your PCP in 1 week if symptoms are not improving.  Worrisome symptoms discussed with instructions to go to the ED.  Patient verbalized understanding and agreed with this plan.    Labs:    No results found for any visits on 10/01/22.     Vital signs reviewed by Ventura Byrne PA-C  /88   Pulse 70   Temp 97.2  F (36.2  C) (Temporal)   SpO2 98%     Current Meds      Current Outpatient Medications:      amoxicillin-clavulanate (AUGMENTIN) 875-125 MG tablet, Take 1 tablet by mouth 2 times daily for 10 days, Disp: 20 tablet, Rfl: 0      Respiratory History    occasional episodes of bronchitis      SUBJECTIVE    HPI: Clayton Hatfield is an 32 year old male who presents with facial pain/pressure and nasal congestion.  Symptoms began 2  weeks ago and has gradually worsening.  There is no shortness of breath, wheezing and chest pain.  Patient is eating and drinking well.  No fever, nausea, vomiting, or diarrhea.    Patient denies any recent travel or exposure to known COVID positive tested individual.      ROS:     Review of Systems   Constitutional:  Negative.  Negative for chills and fever.   HENT: Positive for congestion, sinus pressure and sinus pain. Negative for ear discharge, ear pain, rhinorrhea and sore throat.    Respiratory: Negative.  Negative for cough, chest tightness, shortness of breath and wheezing.    Cardiovascular: Negative.  Negative for chest pain and palpitations.   Gastrointestinal: Negative.  Negative for abdominal pain, diarrhea, nausea and vomiting.   Genitourinary: Negative for dysuria, frequency, hematuria and urgency.   Musculoskeletal: Negative.  Negative for myalgias.   Skin: Negative for rash.   Allergic/Immunologic: Negative.  Negative for immunocompromised state.   Neurological: Negative.  Negative for headaches.         Family History   Family History   Problem Relation Age of Onset     Stomach Cancer Mother 64     Colon Cancer Maternal Grandmother 55     Colon Cancer Maternal Aunt 66        Problem history  Patient Active Problem List   Diagnosis     Family history of colon cancer        Allergies  No Known Allergies     Social History  Social History     Socioeconomic History     Marital status:      Spouse name: Not on file     Number of children: Not on file     Years of education: Not on file     Highest education level: Not on file   Occupational History     Not on file   Tobacco Use     Smoking status: Never Smoker     Smokeless tobacco: Never Used   Substance and Sexual Activity     Alcohol use: Yes     Drug use: No     Sexual activity: Yes     Partners: Female   Other Topics Concern     Not on file   Social History Narrative     Not on file     Social Determinants of Health     Financial Resource Strain: Not on file   Food Insecurity: Not on file   Transportation Needs: Not on file   Physical Activity: Not on file   Stress: Not on file   Social Connections: Not on file   Intimate Partner Violence: Not on file   Housing Stability: Not on file        OBJECTIVE     Vital signs reviewed by Ventura Byrne PA-C  BP  138/88   Pulse 70   Temp 97.2  F (36.2  C) (Temporal)   SpO2 98%      Physical Exam  Vitals reviewed.   Constitutional:       General: He is not in acute distress.     Appearance: He is well-developed. He is not ill-appearing, toxic-appearing or diaphoretic.   HENT:      Head: Normocephalic and atraumatic.      Right Ear: Hearing, tympanic membrane, ear canal and external ear normal. No drainage, swelling or tenderness. Tympanic membrane is not perforated, erythematous, retracted or bulging.      Left Ear: Hearing, tympanic membrane, ear canal and external ear normal. No drainage, swelling or tenderness. Tympanic membrane is not perforated, erythematous, retracted or bulging.      Nose: Congestion present. No nasal tenderness, mucosal edema or rhinorrhea.      Right Turbinates: Not enlarged or swollen.      Left Turbinates: Not enlarged or swollen.      Right Sinus: Maxillary sinus tenderness present. No frontal sinus tenderness.      Left Sinus: Maxillary sinus tenderness present. No frontal sinus tenderness.      Mouth/Throat:      Pharynx: No pharyngeal swelling, oropharyngeal exudate, posterior oropharyngeal erythema or uvula swelling.      Tonsils: No tonsillar exudate. 0 on the right. 0 on the left.   Eyes:      General: Lids are normal.         Right eye: No discharge.         Left eye: No discharge.      Conjunctiva/sclera: Conjunctivae normal.      Right eye: Right conjunctiva is not injected. No exudate.     Left eye: Left conjunctiva is not injected. No exudate.     Pupils: Pupils are equal, round, and reactive to light.   Cardiovascular:      Rate and Rhythm: Normal rate and regular rhythm.      Heart sounds: Normal heart sounds. No murmur heard.    No friction rub. No gallop.   Pulmonary:      Effort: Pulmonary effort is normal. No accessory muscle usage, respiratory distress or retractions.      Breath sounds: Normal breath sounds and air entry. No stridor, decreased air movement or transmitted  upper airway sounds. No decreased breath sounds, wheezing, rhonchi or rales.   Chest:      Chest wall: No tenderness.   Abdominal:      General: Bowel sounds are normal. There is no distension.      Palpations: Abdomen is soft. Abdomen is not rigid. There is no mass.      Tenderness: There is no abdominal tenderness. There is no guarding or rebound.   Musculoskeletal:         General: Normal range of motion.      Cervical back: Normal range of motion and neck supple.   Lymphadenopathy:      Head:      Right side of head: No submental, submandibular, tonsillar, preauricular or posterior auricular adenopathy.      Left side of head: No submental, submandibular, tonsillar, preauricular or posterior auricular adenopathy.      Cervical:      Right cervical: No superficial or posterior cervical adenopathy.     Left cervical: No superficial or posterior cervical adenopathy.   Skin:     General: Skin is warm.      Capillary Refill: Capillary refill takes less than 2 seconds.   Neurological:      Mental Status: He is alert and oriented to person, place, and time.      Cranial Nerves: No cranial nerve deficit.      Sensory: No sensory deficit.      Motor: No abnormal muscle tone.      Coordination: Coordination normal.      Deep Tendon Reflexes: Reflexes normal.   Psychiatric:         Behavior: Behavior normal. Behavior is cooperative.         Thought Content: Thought content normal.         Judgment: Judgment normal.           Ventura Byrne PA-C  10/1/2022, 10:28 AM

## 2023-06-04 ENCOUNTER — HEALTH MAINTENANCE LETTER (OUTPATIENT)
Age: 33
End: 2023-06-04

## 2024-07-13 ENCOUNTER — HEALTH MAINTENANCE LETTER (OUTPATIENT)
Age: 34
End: 2024-07-13

## 2024-09-25 ENCOUNTER — OFFICE VISIT (OUTPATIENT)
Dept: FAMILY MEDICINE | Facility: CLINIC | Age: 34
End: 2024-09-25
Payer: COMMERCIAL

## 2024-09-25 VITALS
SYSTOLIC BLOOD PRESSURE: 129 MMHG | BODY MASS INDEX: 24.64 KG/M2 | DIASTOLIC BLOOD PRESSURE: 79 MMHG | RESPIRATION RATE: 16 BRPM | HEIGHT: 71 IN | WEIGHT: 176 LBS | HEART RATE: 67 BPM | TEMPERATURE: 98.9 F | OXYGEN SATURATION: 100 %

## 2024-09-25 DIAGNOSIS — Z11.59 NEED FOR HEPATITIS C SCREENING TEST: ICD-10-CM

## 2024-09-25 DIAGNOSIS — F41.9 ANXIETY: ICD-10-CM

## 2024-09-25 DIAGNOSIS — Z11.4 SCREENING FOR HIV (HUMAN IMMUNODEFICIENCY VIRUS): ICD-10-CM

## 2024-09-25 DIAGNOSIS — Z00.00 ROUTINE GENERAL MEDICAL EXAMINATION AT A HEALTH CARE FACILITY: Primary | ICD-10-CM

## 2024-09-25 DIAGNOSIS — Z13.220 SCREENING FOR LIPOID DISORDERS: ICD-10-CM

## 2024-09-25 DIAGNOSIS — Z13.1 SCREENING FOR DIABETES MELLITUS: ICD-10-CM

## 2024-09-25 DIAGNOSIS — Z11.3 SCREEN FOR STD (SEXUALLY TRANSMITTED DISEASE): ICD-10-CM

## 2024-09-25 LAB
CHOLEST SERPL-MCNC: 220 MG/DL
FASTING STATUS PATIENT QL REPORTED: YES
FASTING STATUS PATIENT QL REPORTED: YES
GLUCOSE SERPL-MCNC: 100 MG/DL (ref 70–99)
HCV AB SERPL QL IA: NONREACTIVE
HDLC SERPL-MCNC: 42 MG/DL
LDLC SERPL CALC-MCNC: 152 MG/DL
NONHDLC SERPL-MCNC: 178 MG/DL
TRIGL SERPL-MCNC: 129 MG/DL

## 2024-09-25 PROCEDURE — 86803 HEPATITIS C AB TEST: CPT | Performed by: FAMILY MEDICINE

## 2024-09-25 PROCEDURE — 36415 COLL VENOUS BLD VENIPUNCTURE: CPT | Performed by: FAMILY MEDICINE

## 2024-09-25 PROCEDURE — 99395 PREV VISIT EST AGE 18-39: CPT | Mod: 25 | Performed by: FAMILY MEDICINE

## 2024-09-25 PROCEDURE — 99214 OFFICE O/P EST MOD 30 MIN: CPT | Mod: 25 | Performed by: FAMILY MEDICINE

## 2024-09-25 PROCEDURE — 87591 N.GONORRHOEAE DNA AMP PROB: CPT | Performed by: FAMILY MEDICINE

## 2024-09-25 PROCEDURE — 87491 CHLMYD TRACH DNA AMP PROBE: CPT | Performed by: FAMILY MEDICINE

## 2024-09-25 PROCEDURE — 87389 HIV-1 AG W/HIV-1&-2 AB AG IA: CPT | Performed by: FAMILY MEDICINE

## 2024-09-25 PROCEDURE — 90471 IMMUNIZATION ADMIN: CPT | Performed by: FAMILY MEDICINE

## 2024-09-25 PROCEDURE — 82947 ASSAY GLUCOSE BLOOD QUANT: CPT | Performed by: FAMILY MEDICINE

## 2024-09-25 PROCEDURE — 90656 IIV3 VACC NO PRSV 0.5 ML IM: CPT | Performed by: FAMILY MEDICINE

## 2024-09-25 PROCEDURE — 80061 LIPID PANEL: CPT | Performed by: FAMILY MEDICINE

## 2024-09-25 RX ORDER — ESCITALOPRAM OXALATE 10 MG/1
10 TABLET ORAL DAILY
Qty: 30 TABLET | Refills: 1 | Status: SHIPPED | OUTPATIENT
Start: 2024-09-25

## 2024-09-25 SDOH — HEALTH STABILITY: PHYSICAL HEALTH: ON AVERAGE, HOW MANY DAYS PER WEEK DO YOU ENGAGE IN MODERATE TO STRENUOUS EXERCISE (LIKE A BRISK WALK)?: 3 DAYS

## 2024-09-25 SDOH — HEALTH STABILITY: PHYSICAL HEALTH: ON AVERAGE, HOW MANY MINUTES DO YOU ENGAGE IN EXERCISE AT THIS LEVEL?: 30 MIN

## 2024-09-25 ASSESSMENT — SOCIAL DETERMINANTS OF HEALTH (SDOH): HOW OFTEN DO YOU GET TOGETHER WITH FRIENDS OR RELATIVES?: ONCE A WEEK

## 2024-09-25 ASSESSMENT — PAIN SCALES - GENERAL: PAINLEVEL: NO PAIN (0)

## 2024-09-25 NOTE — PROGRESS NOTES
Preventive Care Visit  [facility]  DOTTIE CROWLEY MD, Family Medicine  Sep 25, 2024      Assessment & Plan     Routine general medical examination at a health care facility       Screening for HIV (human immunodeficiency virus)     - HIV Antigen Antibody Combo; Future  - HIV Antigen Antibody Combo    Need for hepatitis C screening test     - Hepatitis C Screen Reflex to HCV RNA Quant and Genotype; Future  - Hepatitis C Screen Reflex to HCV RNA Quant and Genotype    Screen for STD (sexually transmitted disease)     - Chlamydia trachomatis/Neisseria gonorrhoeae by PCR - Clinic Collect    Screening for lipoid disorders     - Glucose; Future  - Lipid panel reflex to direct LDL Non-fasting; Future  - Glucose  - Lipid panel reflex to direct LDL Non-fasting    Screening for diabetes mellitus     - Glucose; Future  - Glucose    Anxiety     - Adult Mental Health  Referral; Future  - escitalopram (LEXAPRO) 10 MG tablet; Take 1 tablet (10 mg) by mouth daily.            Counseling  Appropriate preventive services were addressed with this patient via screening, questionnaire, or discussion as appropriate for fall prevention, nutrition, physical activity, Tobacco-use cessation, social engagement, weight loss and cognition.  Checklist reviewing preventive services available has been given to the patient.          Willie Davis is a 34 year old, presenting for the following:  Physical        9/25/2024    11:15 AM   Additional Questions   Roomed by meenakshi turcios cma        Health Care Directive  Patient does not have a Health Care Directive or Living Will:     HPI  Patient reports some increased anxiety.  Minimal depression symptoms.  Has had multiple life stressors as of late.            9/25/2024   General Health   How would you rate your overall physical health? Excellent   Feel stress (tense, anxious, or unable to sleep) Only a little      (!) STRESS CONCERN      9/25/2024   Nutrition   Three  or more servings of calcium each day? Yes   Diet: Regular (no restrictions)    Breakfast skipped   How many servings of fruit and vegetables per day? (!) 2-3   How many sweetened beverages each day? 0-1       Multiple values from one day are sorted in reverse-chronological order         9/25/2024   Exercise   Days per week of moderate/strenous exercise 3 days   Average minutes spent exercising at this level 30 min            9/25/2024   Social Factors   Frequency of gathering with friends or relatives Once a week   Worry food won't last until get money to buy more No   Food not last or not have enough money for food? No   Do you have housing? (Housing is defined as stable permanent housing and does not include staying ouside in a car, in a tent, in an abandoned building, in an overnight shelter, or couch-surfing.) Yes   Are you worried about losing your housing? No   Lack of transportation? No   Unable to get utilities (heat,electricity)? No            9/25/2024   Dental   Dentist two times every year? Yes            9/25/2024   TB Screening   Were you born outside of the [place]? No            Today's PHQ-2 Score:       9/25/2024     9:56 AM   PHQ-2 ( 1999 [employer])   Q1: Little interest or pleasure in doing things 1   Q2: Feeling down, depressed or hopeless 1   PHQ-2 Score 2   Q1: Little interest or pleasure in doing things Several days   Q2: Feeling down, depressed or hopeless Several days   PHQ-2 Score 2           9/25/2024   Substance Use   Alcohol more than 3/day or more than 7/wk No   Do you use any other substances recreationally? No        Social History     Tobacco Use    Smoking status: Never    Smokeless tobacco: Never   Vaping Use    Vaping status: Never Used   Substance Use Topics    Alcohol use: Yes    Drug use: No             9/25/2024   One time HIV Screening   Previous HIV test? No          9/25/2024   STI Screening   New sexual partner(s) since last STI/HIV test? (!) YES              9/25/2024  "  Contraception/Family Planning   Questions about contraception or family planning No           Reviewed and updated as needed this visit by Provider                          Review of Systems  Constitutional, HEENT, cardiovascular, pulmonary, gi and gu systems are negative, except as otherwise noted.     Objective    Exam  /79 (BP Location: Left arm, Patient Position: Sitting, Cuff Size: Adult Regular)   Pulse 67   Temp 98.9  F (37.2  C)   Resp 16   Ht 1.791 m (5' 10.5\")   Wt 79.8 kg (176 lb)   SpO2 100%   BMI 24.90 kg/m     Estimated body mass index is 24.9 kg/m  as calculated from the following:    Height as of this encounter: 1.791 m (5' 10.5\").    Weight as of this encounter: 79.8 kg (176 lb).    Physical Exam  GENERAL: alert and no distress  NECK: no adenopathy, no asymmetry, masses, or scars  RESP: lungs clear to auscultation - no rales, rhonchi or wheezes  CV: regular rate and rhythm, normal S1 S2, no S3 or S4, no murmur, click or rub, no peripheral edema  ABDOMEN: soft, nontender, no hepatosplenomegaly, no masses and bowel sounds normal  MS: no gross musculoskeletal defects noted, no edema        Signed Electronically by: DOTTIE CROWLEY MD    "

## 2024-09-26 LAB
C TRACH DNA SPEC QL PROBE+SIG AMP: NEGATIVE
HIV 1+2 AB+HIV1 P24 AG SERPL QL IA: NONREACTIVE
N GONORRHOEA DNA SPEC QL NAA+PROBE: NEGATIVE

## 2024-10-03 NOTE — PATIENT INSTRUCTIONS
Patient Education   Preventive Care Advice   This is general advice given by our system to help you stay healthy. However, your care team may have specific advice just for you. Please talk to your care team about your preventive care needs.  Nutrition  Eat 5 or more servings of fruits and vegetables each day.  Try wheat bread, brown rice and whole grain pasta (instead of white bread, rice, and pasta).  Get enough calcium and vitamin D. Check the label on foods and aim for 100% of the RDA (recommended daily allowance).  Lifestyle  Exercise at least 150 minutes each week  (30 minutes a day, 5 days a week).  Do muscle strengthening activities 2 days a week. These help control your weight and prevent disease.  No smoking.  Wear sunscreen to prevent skin cancer.  Have a dental exam and cleaning every 6 months.  Yearly exams  See your health care team every year to talk about:  Any changes in your health.  Any medicines your care team has prescribed.  Preventive care, family planning, and ways to prevent chronic diseases.  Shots (vaccines)   HPV shots (up to age 26), if you've never had them before.  Hepatitis B shots (up to age 59), if you've never had them before.  COVID-19 shot: Get this shot when it's due.  Flu shot: Get a flu shot every year.  Tetanus shot: Get a tetanus shot every 10 years.  Pneumococcal, hepatitis A, and RSV shots: Ask your care team if you need these based on your risk.  Shingles shot (for age 50 and up)  General health tests  Diabetes screening:  Starting at age 35, Get screened for diabetes at least every 3 years.  If you are younger than age 35, ask your care team if you should be screened for diabetes.  Cholesterol test: At age 39, start having a cholesterol test every 5 years, or more often if advised.  Bone density scan (DEXA): At age 50, ask your care team if you should have this scan for osteoporosis (brittle bones).  Hepatitis C: Get tested at least once in your life.  STIs (sexually  transmitted infections)  Before age 24: Ask your care team if you should be screened for STIs.  After age 24: Get screened for STIs if you're at risk. You are at risk for STIs (including HIV) if:  You are sexually active with more than one person.  You don't use condoms every time.  You or a partner was diagnosed with a sexually transmitted infection.  If you are at risk for HIV, ask about PrEP medicine to prevent HIV.  Get tested for HIV at least once in your life, whether you are at risk for HIV or not.  Cancer screening tests  Cervical cancer screening: If you have a cervix, begin getting regular cervical cancer screening tests starting at age 21.  Breast cancer scan (mammogram): If you've ever had breasts, begin having regular mammograms starting at age 40. This is a scan to check for breast cancer.  Colon cancer screening: It is important to start screening for colon cancer at age 45.  Have a colonoscopy test every 10 years (or more often if you're at risk) Or, ask your provider about stool tests like a FIT test every year or Cologuard test every 3 years.  To learn more about your testing options, visit:   .  For help making a decision, visit:   https://bit.ly/py71814.  Prostate cancer screening test: If you have a prostate, ask your care team if a prostate cancer screening test (PSA) at age 55 is right for you.  Lung cancer screening: If you are a current or former smoker ages 50 to 80, ask your care team if ongoing lung cancer screenings are right for you.  For informational purposes only. Not to replace the advice of your health care provider. Copyright   2023 [employer]. All rights reserved. Clinically reviewed by the [facility] Transitions Program. [employer] [id number] - REV 01/24.

## 2024-11-18 ENCOUNTER — MYC MEDICAL ADVICE (OUTPATIENT)
Dept: FAMILY MEDICINE | Facility: CLINIC | Age: 34
End: 2024-11-18
Payer: COMMERCIAL

## 2024-11-18 ENCOUNTER — MYC REFILL (OUTPATIENT)
Dept: FAMILY MEDICINE | Facility: CLINIC | Age: 34
End: 2024-11-18
Payer: COMMERCIAL

## 2024-11-18 DIAGNOSIS — F41.9 ANXIETY: ICD-10-CM

## 2024-11-18 DIAGNOSIS — F41.9 ANXIETY: Primary | ICD-10-CM

## 2024-11-18 RX ORDER — ESCITALOPRAM OXALATE 10 MG/1
10 TABLET ORAL DAILY
Qty: 30 TABLET | Refills: 0 | Status: SHIPPED | OUTPATIENT
Start: 2024-11-18

## 2024-12-03 ENCOUNTER — MYC MEDICAL ADVICE (OUTPATIENT)
Dept: FAMILY MEDICINE | Facility: CLINIC | Age: 34
End: 2024-12-03
Payer: COMMERCIAL

## 2024-12-03 DIAGNOSIS — F41.9 ANXIETY: ICD-10-CM

## 2024-12-03 RX ORDER — ESCITALOPRAM OXALATE 10 MG/1
10 TABLET ORAL DAILY
Qty: 90 TABLET | Refills: 1 | Status: SHIPPED | OUTPATIENT
Start: 2024-12-03